# Patient Record
Sex: MALE | Race: WHITE | HISPANIC OR LATINO | Employment: FULL TIME | URBAN - METROPOLITAN AREA
[De-identification: names, ages, dates, MRNs, and addresses within clinical notes are randomized per-mention and may not be internally consistent; named-entity substitution may affect disease eponyms.]

---

## 2018-06-01 ENCOUNTER — HOSPITAL ENCOUNTER (EMERGENCY)
Facility: HOSPITAL | Age: 28
Discharge: HOME | End: 2018-06-01
Payer: OTHER GOVERNMENT

## 2018-06-01 VITALS
TEMPERATURE: 98 F | WEIGHT: 214.51 LBS | DIASTOLIC BLOOD PRESSURE: 82 MMHG | OXYGEN SATURATION: 98 % | SYSTOLIC BLOOD PRESSURE: 137 MMHG | RESPIRATION RATE: 16 BRPM | HEART RATE: 70 BPM

## 2018-06-01 DIAGNOSIS — N34.2 URETHRITIS: Primary | ICD-10-CM

## 2018-06-01 LAB
BACTERIA UR QL AUTO: ABNORMAL /HPF
BILIRUB UR QL STRIP: NEGATIVE
CLARITY UR: CLEAR
COLOR UR: YELLOW
GLUCOSE UR STRIP-MCNC: NEGATIVE MG/DL
HGB UR QL STRIP.AUTO: ABNORMAL
KETONES UR STRIP-MCNC: NEGATIVE MG/DL
LEUKOCYTE ESTERASE UR QL STRIP: NEGATIVE
MUCOUS THREADS UR QL AUTO: ABNORMAL
NITRITE UR QL STRIP: NEGATIVE
NON-SQ EPI CELLS URNS QL MICRO: ABNORMAL /HPF
PH UR STRIP.AUTO: 6 [PH] (ref 4.5–8)
PROT UR STRIP-MCNC: NEGATIVE MG/DL
RBC #/AREA URNS AUTO: ABNORMAL /HPF
SP GR UR STRIP.AUTO: 1.02 (ref 1–1.03)
UROBILINOGEN UR QL STRIP.AUTO: 1 E.U./DL
WBC #/AREA URNS AUTO: ABNORMAL /HPF

## 2018-06-01 PROCEDURE — 99283 EMERGENCY DEPT VISIT LOW MDM: CPT

## 2018-06-01 PROCEDURE — 96372 THER/PROPH/DIAG INJ SC/IM: CPT

## 2018-06-01 PROCEDURE — 87491 CHLMYD TRACH DNA AMP PROBE: CPT | Performed by: PHYSICIAN ASSISTANT

## 2018-06-01 PROCEDURE — 81001 URINALYSIS AUTO W/SCOPE: CPT | Performed by: PHYSICIAN ASSISTANT

## 2018-06-01 PROCEDURE — 87591 N.GONORRHOEAE DNA AMP PROB: CPT | Performed by: PHYSICIAN ASSISTANT

## 2018-06-01 RX ORDER — AZITHROMYCIN 250 MG/1
1000 TABLET, FILM COATED ORAL ONCE
Status: COMPLETED | OUTPATIENT
Start: 2018-06-01 | End: 2018-06-01

## 2018-06-01 RX ADMIN — LIDOCAINE HYDROCHLORIDE 250 MG: 10 INJECTION, SOLUTION EPIDURAL; INFILTRATION; INTRACAUDAL; PERINEURAL at 07:47

## 2018-06-01 RX ADMIN — AZITHROMYCIN 1000 MG: 250 TABLET, FILM COATED ORAL at 07:49

## 2018-06-01 NOTE — DISCHARGE INSTRUCTIONS
Nonspecific Urethritis in Men   WHAT YOU NEED TO KNOW:   Nonspecific urethritis is a condition that causes inflammation of the urethra  The urethra is the tube where urine passes from the bladder to the outside of the body  Men who have sex with men and those with multiple sexual partners are at a high risk of having this condition  DISCHARGE INSTRUCTIONS:   Medicines:   · Antibiotics: This medicine is used to treat an infection caused by bacteria  Take them as directed  · Take your medicine as directed  Contact your healthcare provider if you think your medicine is not helping or if you have side effects  Tell him of her if you are allergic to any medicine  Keep a list of the medicines, vitamins, and herbs you take  Include the amounts, and when and why you take them  Bring the list or the pill bottles to follow-up visits  Carry your medicine list with you in case of an emergency  Follow up with your healthcare provider as directed:  Write down your questions so you remember to ask them during your visits  Manage your symptoms:   · Heat:  Sit in a warm bath for 15 minutes at least 2 times a day  · Do not use chemical irritants: This includes bath soaps, spermicides, or other products that may cause irritation  Prevent nonspecific urethritis:  If your urethritis was caused by an infection, the following may help to prevent the spread:  · Use condoms:  Wear a condom during oral, vaginal, and anal sex  Ask for more information about the correct way to use condoms  · Do not have sex with someone who has urethritis: This includes oral, vaginal, and anal sex  · Do not have sex during treatment:  Do not have sex while you or your partners are being treated  Ask when it is safe to have sex  · Report pregnancy:  Tell your healthcare provider if your female partner is pregnant  You may have spread an infection to her, and she may pass it to her infant during birth    Contact your healthcare provider if:   · You have a fever  · You have chills, a cough, or feel weak and achy  · You continue to have signs or symptoms after being treated for nonspecific urethritis  · You have questions or concerns about your condition or care  Return to the emergency department if:   · You have joint stiffness, muscle pain, or eye inflammation  · You have pain and swelling in your scrotum  · You have severe abdominal pain  · You have chest pain or trouble breathing  © 2017 [facility] Information is for End User's use only and may not be sold, redistributed or otherwise used for commercial purposes  All illustrations and images included in CareNotes® are the copyrighted property of A D A M , Inc  or Nestor Rodriguez  The above information is an  only  It is not intended as medical advice for individual conditions or treatments  Talk to your doctor, nurse or pharmacist before following any medical regimen to see if it is safe and effective for you

## 2018-06-01 NOTE — ED PROVIDER NOTES
History  Chief Complaint   Patient presents with    Painful Urination     patient presents to the ED with c/o "green sperm" and states it feels like his "pee flow is blocked"  patient denies exposure to STD, was tested at urgent care for UTI, results were negative      70-year-old male with no significant past medical history presents to the emergency department with chief complaint of possible STD  Patient reports for the past three days he has noticed increasing urinary hesitancy, dysuria and green urethral discharge  Onset reported as 3 days ago  Location of symptoms reported as urethra  Quality is reported as dysuria with green colored urethral discharge  Severity is reported as moderate  Associated symptoms:  Denies fevers  Denies abdominal pain  Positive for urinary hesitancy and dysuria  Denies hematuria  Modifying factors:  Urination exacerbate symptoms  Context:  Patient reports he is currently sexually active with his girlfriend  Denies any prior history of STD infection  Patient reports he was seen at a local urgent care where they did a urinalysis which was negative  Medical summary: reviewed past visits via [employer], no prior visits to this ED  History provided by:  Patient   used: No        None       History reviewed  No pertinent past medical history  History reviewed  No pertinent surgical history  History reviewed  No pertinent family history  I have reviewed and agree with the history as documented  Social History   Substance Use Topics    Smoking status: Current Every Day Smoker     Types: E-Cigarettes    Smokeless tobacco: Never Used    Alcohol use No        Review of Systems   Constitutional: Negative for activity change, appetite change, chills, diaphoresis, fatigue, fever and unexpected weight change     HENT: Negative for congestion, dental problem, drooling, ear discharge, ear pain, facial swelling, hearing loss, mouth sores, nosebleeds, postnasal drip, rhinorrhea, sinus pain, sinus pressure, sneezing, sore throat, tinnitus, trouble swallowing and voice change  Eyes: Negative for photophobia, pain, discharge, redness, itching and visual disturbance  Respiratory: Negative for apnea, cough, choking, chest tightness, shortness of breath, wheezing and stridor  Cardiovascular: Negative for chest pain, palpitations and leg swelling  Gastrointestinal: Negative for abdominal distention, abdominal pain, anal bleeding, blood in stool, constipation, diarrhea, nausea and vomiting  Endocrine: Negative for cold intolerance, heat intolerance, polydipsia, polyphagia and polyuria  Genitourinary: Positive for discharge and dysuria  Negative for decreased urine volume, difficulty urinating, flank pain, frequency, hematuria, penile pain, penile swelling, scrotal swelling, testicular pain and urgency  Musculoskeletal: Negative for arthralgias, back pain, gait problem, joint swelling, myalgias, neck pain and neck stiffness  Skin: Negative for color change, pallor, rash and wound  Allergic/Immunologic: Negative for environmental allergies, food allergies and immunocompromised state  Neurological: Negative for dizziness, tremors, seizures, syncope, facial asymmetry, speech difficulty, weakness, light-headedness, numbness and headaches  Hematological: Negative for adenopathy  Does not bruise/bleed easily  Psychiatric/Behavioral: Negative for agitation, confusion and hallucinations  The patient is not nervous/anxious  All other systems reviewed and are negative  Physical Exam  Physical Exam   Constitutional: He is oriented to person, place, and time  He appears well-developed and well-nourished  No distress  /82   Pulse 70   Temp 98 °F (36 7 °C)   Resp 16   Wt 97 3 kg (214 lb 8 1 oz)   SpO2 98%    HENT:   Head: Normocephalic and atraumatic     Right Ear: External ear normal    Left Ear: External ear normal    Nose: Nose normal  Mouth/Throat: Oropharynx is clear and moist  No oropharyngeal exudate  Eyes: Conjunctivae and EOM are normal  Pupils are equal, round, and reactive to light  Right eye exhibits no discharge  Left eye exhibits no discharge  No scleral icterus  Neck: Normal range of motion  Neck supple  No JVD present  No tracheal deviation present  No thyromegaly present  Cardiovascular: Normal rate, regular rhythm and intact distal pulses  Pulmonary/Chest: Effort normal and breath sounds normal  No stridor  No respiratory distress  He has no wheezes  He has no rales  He exhibits no tenderness  Abdominal: Soft  Bowel sounds are normal  He exhibits no distension and no mass  There is no tenderness  There is no rebound and no guarding  No hernia  Musculoskeletal: Normal range of motion  He exhibits no edema, tenderness or deformity  Lymphadenopathy:     He has no cervical adenopathy  Neurological: He is alert and oriented to person, place, and time  He displays normal reflexes  No cranial nerve deficit or sensory deficit  He exhibits normal muscle tone  Coordination normal    Skin: Skin is warm and dry  Capillary refill takes less than 2 seconds  No rash noted  He is not diaphoretic  No erythema  No pallor  Psychiatric: He has a normal mood and affect  His behavior is normal  Judgment and thought content normal    Nursing note and vitals reviewed        Vital Signs  ED Triage Vitals   Temperature Pulse Respirations Blood Pressure SpO2   06/01/18 0733 06/01/18 0731 06/01/18 0731 06/01/18 0731 06/01/18 0731   98 °F (36 7 °C) 70 16 137/82 98 %      Temp src Heart Rate Source Patient Position - Orthostatic VS BP Location FiO2 (%)   -- -- -- -- --             Pain Score       --                  Vitals:    06/01/18 0731   BP: 137/82   Pulse: 70       Visual Acuity      ED Medications  Medications   cefTRIAXone (ROCEPHIN) 250 mg in lidocaine (PF) (XYLOCAINE-MPF) 1 % IM only syringe (250 mg Intramuscular Given 6/1/18 0747) azithromycin (ZITHROMAX) tablet 1,000 mg (1,000 mg Oral Given 6/1/18 0749)       Diagnostic Studies  Results Reviewed     Procedure Component Value Units Date/Time    Urine Microscopic [[id number]]  (Abnormal) Collected:  06/01/18 0738    Lab Status:  Final result Specimen:  Urine from Urine, Clean Catch Updated:  06/01/18 0757     RBC, UA 4-10 (A) /hpf      WBC, UA None Seen /hpf      Epithelial Cells None Seen /hpf      Bacteria, UA None Seen /hpf      MUCOUS THREADS Occasional (A)    UA w Reflex to Microscopic w Reflex to Culture [[id number]]  (Abnormal) Collected:  06/01/18 0738    Lab Status:  Final result Specimen:  Urine from Urine, Clean Catch Updated:  06/01/18 0744     Color, UA Yellow     Clarity, UA Clear     Specific Gravity, UA 1 025     pH, UA 6 0     Leukocytes, UA Negative     Nitrite, UA Negative     Protein, UA Negative mg/dl      Glucose, UA Negative mg/dl      Ketones, UA Negative mg/dl      Urobilinogen, UA 1 0 E U /dl      Bilirubin, UA Negative     Blood, UA Trace-Intact (A)    Chlamydia/GC amplified DNA by PCR [[id number]] Collected:  06/01/18 0738    Lab Status: In process Specimen:  Urine from Urine, Other Updated:  06/01/18 0741                 No orders to display              Procedures  Procedures       Phone Contacts  ED Phone Contact    ED Course  ED Course as of Jun 01 1545 Fri Jun 01, 2018   0756 Blood, UA: (!) Trace-Intact   0756 Nitrite, UA: Negative   0756 Leukocytes, UA: Negative   0756 UA reviewed- positive for trace blood  No nitrites or leukocytes  Leukocytes, UA: Negative                               MDM  Number of Diagnoses or Management Options  Urethritis: new and requires workup  Diagnosis management comments: ddx includes but is not limited to UTI, STI, pyelonephritis, kidney stone, plan check STD testing,  check UA  Lab results reviewed:  UA remarkable for trace blood, negative for leukocytes and nitrites  GC/chlamydia pending       Discussed lab results with patient at bedside  Discussed will treat empirically with coverage for GC/chlamydia  Discussed will call patient if testing positive and will need to inform all sexual partners within past 3 months  Discussed follow up with pcp and urology as outpatient in 3-5 days for further evaluation of symptoms  Reviewed reasons to return to ed  Patient verbalized understanding of diagnosis and agreement with discharge plan of care as well as understanding of reasons to return to ed  Amount and/or Complexity of Data Reviewed  Clinical lab tests: ordered and reviewed  Discussion of test results with the performing providers: yes  Obtain history from someone other than the patient: yes (Significant other at bedside  )  Review and summarize past medical records: yes    Patient Progress  Patient progress: stable    CritCare Time    Disposition  Final diagnoses:   Urethritis     Time reflects when diagnosis was documented in both MDM as applicable and the Disposition within this note     Time User Action Codes Description Comment    6/1/2018  7:39 AM Adrián Ramos Add [N34 2] Urethritis       ED Disposition     ED Disposition Condition Comment    Discharge  Kaylin Pair discharge to home/self care  Condition at discharge: Stable        Follow-up Information     Follow up With Specialties Details Why Contact Info Additional Information    Marry Matta MD Family Medicine Call in 2 days for further evaluation of symptoms [address]   [address]  [facility] [address]       Danny Alves MD Urology Call in 2 days for further evaluation of symptoms [place] [address]   [phone]       [facility] Emergency Department Emergency Medicine Go to If symptoms worsen [address] [facility] [address], [address], [place], [address]          There are no discharge medications for this patient      No discharge procedures on file      ED Provider  Electronically Signed by           Sheree Garcia PA-C  06/01/18 1549

## 2018-06-05 LAB
CHLAMYDIA DNA CVX QL NAA+PROBE: NORMAL
N GONORRHOEA DNA GENITAL QL NAA+PROBE: NORMAL

## 2018-12-11 ENCOUNTER — OFFICE VISIT (OUTPATIENT)
Dept: URGENT CARE | Age: 28
End: 2018-12-11
Payer: OTHER GOVERNMENT

## 2018-12-11 VITALS
BODY MASS INDEX: 29.12 KG/M2 | TEMPERATURE: 98.9 F | HEIGHT: 71 IN | OXYGEN SATURATION: 97 % | DIASTOLIC BLOOD PRESSURE: 74 MMHG | WEIGHT: 208 LBS | RESPIRATION RATE: 18 BRPM | SYSTOLIC BLOOD PRESSURE: 131 MMHG | HEART RATE: 68 BPM

## 2018-12-11 DIAGNOSIS — J45.901 MILD ASTHMA WITH ACUTE EXACERBATION, UNSPECIFIED WHETHER PERSISTENT: Primary | ICD-10-CM

## 2018-12-11 DIAGNOSIS — J20.8 ACUTE BRONCHITIS DUE TO OTHER SPECIFIED ORGANISMS: ICD-10-CM

## 2018-12-11 PROCEDURE — G0382 LEV 3 HOSP TYPE B ED VISIT: HCPCS | Performed by: FAMILY MEDICINE

## 2018-12-11 RX ORDER — ALBUTEROL SULFATE 2.5 MG/3ML
2.5 SOLUTION RESPIRATORY (INHALATION) ONCE
Status: COMPLETED | OUTPATIENT
Start: 2018-12-11 | End: 2018-12-11

## 2018-12-11 RX ORDER — AZITHROMYCIN 250 MG/1
TABLET, FILM COATED ORAL
Qty: 6 TABLET | Refills: 0 | Status: SHIPPED | OUTPATIENT
Start: 2018-12-11 | End: 2018-12-15

## 2018-12-11 RX ORDER — ALBUTEROL SULFATE 90 UG/1
1-2 AEROSOL, METERED RESPIRATORY (INHALATION) EVERY 6 HOURS PRN
Qty: 18 G | Refills: 0 | Status: SHIPPED | OUTPATIENT
Start: 2018-12-11

## 2018-12-11 RX ORDER — PREDNISONE 10 MG/1
TABLET ORAL
Qty: 1 EACH | Refills: 0 | Status: SHIPPED | OUTPATIENT
Start: 2018-12-11 | End: 2022-06-21

## 2018-12-11 RX ADMIN — Medication 0.5 MG: at 13:55

## 2018-12-11 RX ADMIN — ALBUTEROL SULFATE 2.5 MG: 2.5 SOLUTION RESPIRATORY (INHALATION) at 13:55

## 2018-12-11 NOTE — PATIENT INSTRUCTIONS
Take antibiotics as prescribed  Use steroids as prescribed  Did not take any NSAIDs such as ibuprofen, Aleve, Motrin with the steroids  Use inhaler if needed  Stay hydrated with lots of water/fluids  Follow-up with PCP in the next few days for reexamination and to ensure resolution of symptoms  Call [facility] to schedule an appointment: [phone]  Go to the ED if any intractable fevers, unable to stay hydrated, abdominal pain, chest pain, shortness of breath, new or worsening symptoms or other concerning symptoms

## 2018-12-11 NOTE — PROGRESS NOTES
[facility]        NAME: Fatuma Tineo is a 34 y o  male  : 1990    MRN: [id number]  DATE: 2018  TIME: 1:56 PM    Assessment and Plan   Mild asthma with acute exacerbation, unspecified whether persistent [J45 901]  1  Mild asthma with acute exacerbation, unspecified whether persistent  ipratropium (ATROVENT) 0 02 % inhalation solution 0 5 mg    albuterol inhalation solution 2 5 mg    albuterol (VENTOLIN HFA) 90 mcg/act inhaler    PredniSONE 10 MG (21) TBPK   2  Acute bronchitis due to other specified organisms  azithromycin (ZITHROMAX) 250 mg tablet    albuterol (VENTOLIN HFA) 90 mcg/act inhaler    PredniSONE 10 MG (21) TBPK      Patient reexamined after DuoNeb- patient feels much better  Lungs CTA bilaterally  No further wheezes  Denies any CP, SOB or further tightness  Stable for outpatient follow-up with PCP  InfoLink given  Patient verbalizes good understanding to follow-up with PCP or go to ED if any new or worsening conditions  Patient Instructions       Take antibiotics as prescribed  Use steroids as prescribed  Did not take any NSAIDs such as ibuprofen, Aleve, Motrin with the steroids  Use inhaler if needed  Stay hydrated with lots of water/fluids  Follow-up with PCP in the next few days for reexamination and to ensure resolution of symptoms  Call [facility] to schedule an appointment: [phone]  Go to the ED if any intractable fevers, unable to stay hydrated, abdominal pain, chest pain, shortness of breath, new or worsening symptoms or other concerning symptoms  Chief Complaint     Chief Complaint   Patient presents with    Cold Like Symptoms     cough, chest tightness, and difficulty breathing x 1 5 weeks         History of Present Illness       22-year-old male with past medical history of asthma presents with phlegmy/productive cough x1 5 weeks, intermittent lungs/chest tightness with deep breathing, intermittent wheezes     Patient states this feels like his prior asthma flare-ups with bronchitis  States he did smoke cigarettes and has been trying to quit however he has still been vaping  Denies any chest pain, shortness of breath, fevers, nausea, vomiting, abdominal pain, sore throat, ear pain or other complaints  States he did try a his old/ albuterol inhaler a few days ago which did help  Review of Systems   Review of Systems   Constitutional: Negative for chills, fatigue and fever  HENT: Negative for congestion, ear pain, rhinorrhea, sore throat and trouble swallowing  Eyes: Negative for itching and visual disturbance  Respiratory: Positive for cough and wheezing  Negative for shortness of breath  Cardiovascular: Negative for chest pain and leg swelling  Gastrointestinal: Negative for abdominal pain, diarrhea, nausea and vomiting  Musculoskeletal: Negative for back pain, joint swelling, neck pain and neck stiffness  Skin: Negative for rash  Neurological: Negative for dizziness, seizures, weakness, numbness and headaches  All other systems reviewed and are negative          Current Medications       Current Outpatient Prescriptions:     NICOTINE STEP 1 TD, Place on the skin, Disp: , Rfl:     albuterol (VENTOLIN HFA) 90 mcg/act inhaler, Inhale 1-2 puffs every 6 (six) hours as needed for wheezing, Disp: 18 g, Rfl: 0    azithromycin (ZITHROMAX) 250 mg tablet, Take 2 tablets today then 1 tablet daily x 4 days, Disp: 6 tablet, Rfl: 0    PredniSONE 10 MG (21) TBPK, Take 6tabs(60mg) on Day 1, 5tabs(50mg) on Day 2, 4tabs(40mg) on day 3, 3tabs(30mg) on Day 4, 2tabs(20mg) on Day 5, 1tab(10mg) on Day 6, Disp: 1 each, Rfl: 0    Current Facility-Administered Medications:     albuterol inhalation solution 2 5 mg, 2 5 mg, Nebulization, Once, Darby So PA-C    ipratropium (ATROVENT) 0 02 % inhalation solution 0 5 mg, 0 5 mg, Nebulization, Once, Darby So PA-C    Current Allergies     Allergies as of 2018    (No Known Allergies)            The following portions of the patient's history were reviewed and updated as appropriate: allergies, current medications, past family history, past medical history, past social history, past surgical history and problem list      Past Medical History:   Diagnosis Date    Asthma        History reviewed  No pertinent surgical history  No family history on file  Medications have been verified  Objective   /74   Pulse 68   Temp 98 9 °F (37 2 °C) (Temporal)   Resp 18   Ht 5' 11" (1 803 m)   Wt 94 3 kg (208 lb)   SpO2 97%   BMI 29 01 kg/m²        Physical Exam     Physical Exam   Constitutional: He is oriented to person, place, and time  He appears well-developed and well-nourished  No distress  Nontoxic appearing, talking in complete sentences without difficulty   HENT:   Head: Normocephalic and atraumatic  Right Ear: Hearing, tympanic membrane, external ear and ear canal normal    Left Ear: Hearing, tympanic membrane, external ear and ear canal normal    Nose: Rhinorrhea present  Right sinus exhibits no maxillary sinus tenderness and no frontal sinus tenderness  Left sinus exhibits no maxillary sinus tenderness and no frontal sinus tenderness  Mouth/Throat: Uvula is midline and mucous membranes are normal  Posterior oropharyngeal erythema present  No oropharyngeal exudate, posterior oropharyngeal edema or tonsillar abscesses  Eyes: Pupils are equal, round, and reactive to light  Conjunctivae and EOM are normal    Neck: Normal range of motion  Neck supple  Cardiovascular: Normal rate, regular rhythm and normal heart sounds  Pulmonary/Chest: Effort normal  No respiratory distress  He has wheezes (Mild, diffuse expiratory wheezes)  Musculoskeletal: Normal range of motion  He exhibits no tenderness  Neurological: He is alert and oriented to person, place, and time  Skin: Skin is warm and dry  No rash noted     Psychiatric: He has a normal mood and affect  His behavior is normal    Nursing note and vitals reviewed

## 2020-05-02 ENCOUNTER — HOSPITAL ENCOUNTER (EMERGENCY)
Facility: HOSPITAL | Age: 30
Discharge: HOME | End: 2020-05-03
Attending: EMERGENCY MEDICINE | Admitting: EMERGENCY MEDICINE
Payer: COMMERCIAL

## 2020-05-02 VITALS
SYSTOLIC BLOOD PRESSURE: 126 MMHG | DIASTOLIC BLOOD PRESSURE: 69 MMHG | OXYGEN SATURATION: 98 % | HEART RATE: 85 BPM | TEMPERATURE: 97.7 F | RESPIRATION RATE: 18 BRPM

## 2020-05-02 DIAGNOSIS — E86.0 DEHYDRATION: ICD-10-CM

## 2020-05-02 DIAGNOSIS — R51.9 HEADACHE: Primary | ICD-10-CM

## 2020-05-02 DIAGNOSIS — R11.2 NAUSEA & VOMITING: ICD-10-CM

## 2020-05-02 PROCEDURE — 99284 EMERGENCY DEPT VISIT MOD MDM: CPT

## 2020-05-02 RX ORDER — ONDANSETRON 4 MG/1
4 TABLET, ORALLY DISINTEGRATING ORAL ONCE
Status: COMPLETED | OUTPATIENT
Start: 2020-05-02 | End: 2020-05-02

## 2020-05-02 RX ADMIN — ONDANSETRON 4 MG: 4 TABLET, ORALLY DISINTEGRATING ORAL at 23:40

## 2020-05-03 LAB
ANION GAP SERPL CALCULATED.3IONS-SCNC: 7 MMOL/L (ref 4–13)
BASOPHILS # BLD AUTO: 0.06 THOUSANDS/ΜL (ref 0–0.1)
BASOPHILS NFR BLD AUTO: 1 % (ref 0–1)
BUN SERPL-MCNC: 12 MG/DL (ref 5–25)
CALCIUM SERPL-MCNC: 8.5 MG/DL (ref 8.3–10.1)
CHLORIDE SERPL-SCNC: 111 MMOL/L (ref 100–108)
CO2 SERPL-SCNC: 25 MMOL/L (ref 21–32)
CREAT SERPL-MCNC: 0.94 MG/DL (ref 0.6–1.3)
EOSINOPHIL # BLD AUTO: 0.05 THOUSAND/ΜL (ref 0–0.61)
EOSINOPHIL NFR BLD AUTO: 1 % (ref 0–6)
ERYTHROCYTE [DISTWIDTH] IN BLOOD BY AUTOMATED COUNT: 13.1 % (ref 11.6–15.1)
GFR SERPL CREATININE-BSD FRML MDRD: 110 ML/MIN/1.73SQ M
GLUCOSE SERPL-MCNC: 102 MG/DL (ref 65–140)
HCT VFR BLD AUTO: 42.7 % (ref 36.5–49.3)
HGB BLD-MCNC: 14.6 G/DL (ref 12–17)
IMM GRANULOCYTES # BLD AUTO: 0.04 THOUSAND/UL (ref 0–0.2)
IMM GRANULOCYTES NFR BLD AUTO: 0 % (ref 0–2)
LYMPHOCYTES # BLD AUTO: 0.95 THOUSANDS/ΜL (ref 0.6–4.47)
LYMPHOCYTES NFR BLD AUTO: 9 % (ref 14–44)
MCH RBC QN AUTO: 30.1 PG (ref 26.8–34.3)
MCHC RBC AUTO-ENTMCNC: 34.2 G/DL (ref 31.4–37.4)
MCV RBC AUTO: 88 FL (ref 82–98)
MONOCYTES # BLD AUTO: 0.49 THOUSAND/ΜL (ref 0.17–1.22)
MONOCYTES NFR BLD AUTO: 5 % (ref 4–12)
NEUTROPHILS # BLD AUTO: 8.9 THOUSANDS/ΜL (ref 1.85–7.62)
NEUTS SEG NFR BLD AUTO: 84 % (ref 43–75)
NRBC BLD AUTO-RTO: 0 /100 WBCS
PLATELET # BLD AUTO: 219 THOUSANDS/UL (ref 149–390)
PMV BLD AUTO: 10.7 FL (ref 8.9–12.7)
POTASSIUM SERPL-SCNC: 4.8 MMOL/L (ref 3.5–5.3)
RBC # BLD AUTO: 4.85 MILLION/UL (ref 3.88–5.62)
SODIUM SERPL-SCNC: 143 MMOL/L (ref 136–145)
WBC # BLD AUTO: 10.49 THOUSAND/UL (ref 4.31–10.16)

## 2020-05-03 PROCEDURE — 80048 BASIC METABOLIC PNL TOTAL CA: CPT | Performed by: EMERGENCY MEDICINE

## 2020-05-03 PROCEDURE — 96365 THER/PROPH/DIAG IV INF INIT: CPT

## 2020-05-03 PROCEDURE — 96361 HYDRATE IV INFUSION ADD-ON: CPT

## 2020-05-03 PROCEDURE — 36415 COLL VENOUS BLD VENIPUNCTURE: CPT | Performed by: EMERGENCY MEDICINE

## 2020-05-03 PROCEDURE — 85025 COMPLETE CBC W/AUTO DIFF WBC: CPT | Performed by: EMERGENCY MEDICINE

## 2020-05-03 PROCEDURE — 96375 TX/PRO/DX INJ NEW DRUG ADDON: CPT

## 2020-05-03 PROCEDURE — 99284 EMERGENCY DEPT VISIT MOD MDM: CPT | Performed by: EMERGENCY MEDICINE

## 2020-05-03 RX ORDER — METOCLOPRAMIDE HYDROCHLORIDE 5 MG/ML
10 INJECTION INTRAMUSCULAR; INTRAVENOUS ONCE
Status: COMPLETED | OUTPATIENT
Start: 2020-05-03 | End: 2020-05-03

## 2020-05-03 RX ORDER — KETOROLAC TROMETHAMINE 30 MG/ML
15 INJECTION, SOLUTION INTRAMUSCULAR; INTRAVENOUS ONCE
Status: COMPLETED | OUTPATIENT
Start: 2020-05-03 | End: 2020-05-03

## 2020-05-03 RX ORDER — ACETAMINOPHEN 325 MG/1
975 TABLET ORAL ONCE
Status: COMPLETED | OUTPATIENT
Start: 2020-05-03 | End: 2020-05-03

## 2020-05-03 RX ORDER — MAGNESIUM SULFATE HEPTAHYDRATE 40 MG/ML
2 INJECTION, SOLUTION INTRAVENOUS ONCE
Status: COMPLETED | OUTPATIENT
Start: 2020-05-03 | End: 2020-05-03

## 2020-05-03 RX ADMIN — MAGNESIUM SULFATE HEPTAHYDRATE 2 G: 40 INJECTION, SOLUTION INTRAVENOUS at 00:57

## 2020-05-03 RX ADMIN — ACETAMINOPHEN 975 MG: 325 TABLET ORAL at 00:19

## 2020-05-03 RX ADMIN — METOCLOPRAMIDE 10 MG: 5 INJECTION, SOLUTION INTRAMUSCULAR; INTRAVENOUS at 00:56

## 2020-05-03 RX ADMIN — SODIUM CHLORIDE 1000 ML: 0.9 INJECTION, SOLUTION INTRAVENOUS at 00:20

## 2020-05-03 RX ADMIN — KETOROLAC TROMETHAMINE 15 MG: 30 INJECTION, SOLUTION INTRAMUSCULAR at 00:19

## 2021-03-03 ENCOUNTER — HOSPITAL ENCOUNTER (EMERGENCY)
Facility: HOSPITAL | Age: 31
Discharge: HOME | End: 2021-03-03
Attending: EMERGENCY MEDICINE | Admitting: EMERGENCY MEDICINE
Payer: COMMERCIAL

## 2021-03-03 VITALS
OXYGEN SATURATION: 97 % | HEART RATE: 72 BPM | RESPIRATION RATE: 17 BRPM | TEMPERATURE: 98.9 F | SYSTOLIC BLOOD PRESSURE: 124 MMHG | BODY MASS INDEX: 29 KG/M2 | WEIGHT: 207.89 LBS | DIASTOLIC BLOOD PRESSURE: 65 MMHG

## 2021-03-03 DIAGNOSIS — G51.0 BELL'S PALSY: Primary | ICD-10-CM

## 2021-03-03 PROCEDURE — 86618 LYME DISEASE ANTIBODY: CPT | Performed by: EMERGENCY MEDICINE

## 2021-03-03 PROCEDURE — 99285 EMERGENCY DEPT VISIT HI MDM: CPT | Performed by: EMERGENCY MEDICINE

## 2021-03-03 PROCEDURE — 99284 EMERGENCY DEPT VISIT MOD MDM: CPT

## 2021-03-03 PROCEDURE — 93005 ELECTROCARDIOGRAM TRACING: CPT

## 2021-03-03 PROCEDURE — 36415 COLL VENOUS BLD VENIPUNCTURE: CPT | Performed by: EMERGENCY MEDICINE

## 2021-03-03 RX ORDER — PREDNISONE 20 MG/1
60 TABLET ORAL ONCE
Status: COMPLETED | OUTPATIENT
Start: 2021-03-03 | End: 2021-03-03

## 2021-03-03 RX ORDER — MINERAL OIL, PETROLATUM 425; 568 MG/G; MG/G
0.25 OINTMENT OPHTHALMIC
Qty: 3.5 G | Refills: 1 | Status: SHIPPED | OUTPATIENT
Start: 2021-03-03

## 2021-03-03 RX ORDER — VALACYCLOVIR HYDROCHLORIDE 500 MG/1
1000 TABLET, FILM COATED ORAL ONCE
Status: COMPLETED | OUTPATIENT
Start: 2021-03-03 | End: 2021-03-03

## 2021-03-03 RX ORDER — PREDNISONE 10 MG/1
TABLET ORAL
Qty: 54 TABLET | Refills: 0 | Status: SHIPPED | OUTPATIENT
Start: 2021-03-04 | End: 2021-03-18

## 2021-03-03 RX ORDER — VALACYCLOVIR HYDROCHLORIDE 1 G/1
1000 TABLET, FILM COATED ORAL 3 TIMES DAILY
Qty: 30 TABLET | Refills: 0 | Status: SHIPPED | OUTPATIENT
Start: 2021-03-03 | End: 2022-06-21

## 2021-03-03 RX ADMIN — VALACYCLOVIR HYDROCHLORIDE 1000 MG: 500 TABLET, FILM COATED ORAL at 09:01

## 2021-03-03 RX ADMIN — PREDNISONE 60 MG: 20 TABLET ORAL at 09:01

## 2021-03-03 NOTE — ED PROVIDER NOTES
History  Chief Complaint   Patient presents with    Facial Numbness     Pt with facial numbness, started yesterday he thinks  Patient is a 65-year-old male with past medical history of asthma, presents to the emergency department complaining of numbness and tingling and weakness of the left side of his face that he 1st noticed yesterday but worsened today  Patient reports that he feels as though the left side of his face is frozen and he is having difficulty closing his eye  He denies ever having these symptoms before  Patient does report recently having an exposure to STD and got STD checked on 02/22 and was positive for chlamydia  He was negative for HIV, gonorrhea, syphilis  He did not get tested for herpes  Patient reports that prior to getting tested he was having penile discharge and dysuria but that has resolved  He was prescribed doxycycline and has approximately 2 days left  He also got a shot of an antibiotic but is unsure what it was  He reports about a week ago he did have a rash on both of his arms but that has resolved  The rash was nonpruritic  He also states that several weeks ago he was having some spasm in the left side of his neck but that too has resolved  Patient reports he feels as though the left eye is dry and slightly blurry because of that  He denies any fever, neck pain or stiffness currently, shaking chills, headaches, dizziness or near syncope, photophobia, tinnitus, loss of hearing, chest pain, palpitations, dyspnea, abdominal pain, nausea, vomiting, diarrhea, constipation, urinary symptoms currently, penile pain or discharge currently, testicular pain or swelling, genital sores or lesions, mouth sores or lesions, extremity swelling or pain, extremity weakness or paresthesia, slurring of speech or difficulty getting words out or other focal neurologic deficits    He denies any known or recent tick bite but does report he works outside and is in the woods a lot       History provided by:  Patient   used: No        Prior to Admission Medications   Prescriptions Last Dose Informant Patient Reported? Taking? NICOTINE STEP 1 TD   Yes No   Sig: Place on the skin   PredniSONE 10 MG (21) TBPK   No No   Sig: Take 6tabs(60mg) on Day 1, 5tabs(50mg) on Day 2, 4tabs(40mg) on day 3, 3tabs(30mg) on Day 4, 2tabs(20mg) on Day 5, 1tab(10mg) on Day 6   albuterol (VENTOLIN HFA) 90 mcg/act inhaler   No No   Sig: Inhale 1-2 puffs every 6 (six) hours as needed for wheezing      Facility-Administered Medications: None       Past Medical History:   Diagnosis Date    Asthma        History reviewed  No pertinent surgical history  History reviewed  No pertinent family history  I have reviewed and agree with the history as documented  E-Cigarette/Vaping     E-Cigarette/Vaping Substances     Social History     Tobacco Use    Smoking status: Current Every Day Smoker     Types: E-Cigarettes    Smokeless tobacco: Never Used   Substance Use Topics    Alcohol use: No    Drug use: No       Review of Systems   Constitutional: Negative for chills and fever  HENT: Negative for congestion, ear pain, hearing loss, rhinorrhea, sore throat and tinnitus  Eyes: Positive for visual disturbance  Negative for photophobia and pain  +Left eye dryness   Respiratory: Negative for cough, chest tightness, shortness of breath and wheezing  Cardiovascular: Negative for chest pain and palpitations  Gastrointestinal: Negative for abdominal pain, blood in stool, constipation, diarrhea, nausea and vomiting  Genitourinary: Negative for dysuria, flank pain, frequency and hematuria  Musculoskeletal: Negative for back pain, neck pain and neck stiffness  Skin: Negative for color change, pallor, rash and wound  Allergic/Immunologic: Negative for immunocompromised state  Neurological: Positive for facial asymmetry and numbness   Negative for dizziness, seizures, syncope, weakness, light-headedness and headaches  Hematological: Negative for adenopathy  Psychiatric/Behavioral: Negative for confusion and decreased concentration  All other systems reviewed and are negative  Physical Exam  Physical Exam  Vitals signs and nursing note reviewed  Constitutional:       General: He is not in acute distress  Appearance: Normal appearance  He is well-developed  He is not ill-appearing, toxic-appearing or diaphoretic  HENT:      Head: Normocephalic and atraumatic  Right Ear: Tympanic membrane, ear canal and external ear normal       Left Ear: Tympanic membrane, ear canal and external ear normal       Nose: Nose normal       Mouth/Throat:      Mouth: Mucous membranes are moist       Pharynx: Oropharynx is clear  No oropharyngeal exudate  Eyes:      General:         Right eye: No discharge  Left eye: No discharge  Extraocular Movements: Extraocular movements intact  Conjunctiva/sclera: Conjunctivae normal       Pupils: Pupils are equal, round, and reactive to light  Neck:      Musculoskeletal: Normal range of motion and neck supple  No neck rigidity  Vascular: No JVD  Cardiovascular:      Rate and Rhythm: Normal rate and regular rhythm  Pulses: Normal pulses  Heart sounds: Normal heart sounds  No murmur  No friction rub  No gallop  Pulmonary:      Effort: Pulmonary effort is normal  No respiratory distress  Breath sounds: Normal breath sounds  No wheezing, rhonchi or rales  Abdominal:      General: There is no distension  Palpations: Abdomen is soft  Tenderness: There is no abdominal tenderness  There is no guarding or rebound  Musculoskeletal: Normal range of motion  General: No swelling or tenderness  Lymphadenopathy:      Cervical: No cervical adenopathy  Skin:     General: Skin is warm and dry  Coloration: Skin is not pale  Findings: No erythema or rash     Neurological:      General: No focal deficit present  Mental Status: He is alert and oriented to person, place, and time  Cranial Nerves: Cranial nerve deficit present  Sensory: No sensory deficit  Motor: No weakness  Comments: 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 including the upper, mid and lower face  Patient unable to fully close the eye and unable to lift left eyebrow or crackle left side of forehead  5/5 strength in all 4 extremities  No gross sensory deficits in all 4 extremities  Patient has mild decreased sensation over the left side of his upper face compared to the right side  Normal speech  Psychiatric:         Mood and Affect: Mood normal          Behavior: Behavior normal          Vital Signs  ED Triage Vitals   Temperature Pulse Respirations Blood Pressure SpO2   03/03/21 0904 03/03/21 0817 03/03/21 0817 03/03/21 0817 03/03/21 0817   98 9 °F (37 2 °C) 82 16 140/75 99 %      Temp Source Heart Rate Source Patient Position - Orthostatic VS BP Location FiO2 (%)   03/03/21 0904 03/03/21 0830 03/03/21 0830 03/03/21 0830 --   Oral Monitor Lying Right arm       Pain Score       --                Vitals:    03/03/21 0817 03/03/21 0830 03/03/21 0900 03/03/21 0904   BP: 140/75 135/80 124/65    BP Location:  Right arm Right arm    Pulse: 82 80 72    Resp: 16 18 17    Temp:    98 9 °F (37 2 °C)   TempSrc:    Oral   SpO2: 99% 97% 97%    Weight: 94 3 kg (207 lb 14 3 oz)          Visual Acuity  Visual Acuity      Most Recent Value   L Pupil Size (mm)  4   R Pupil Size (mm)  4          ED Medications  Medications   valACYclovir (VALTREX) tablet 1,000 mg (1,000 mg Oral Given 3/3/21 0901)   predniSONE tablet 60 mg (60 mg Oral Given 3/3/21 0901)       Diagnostic Studies  Results Reviewed     Procedure Component Value Units Date/Time    Lyme Antibody Profile with reflex to [facility] [[id number]] Collected: 03/03/21 0855    Lab Status:  In process Specimen: Blood from Arm, Left Updated: 03/03/21 9756 No orders to display              Procedures  ECG 12 Lead Documentation Only    Date/Time: 3/3/2021 9:07 AM  Performed by: Nicola Anne DO  Authorized by: Nicola Anne DO     ECG reviewed by me, the ED Provider: yes    Patient location:  ED  Rate:     ECG rate:  77    ECG rate assessment: normal    Rhythm:     Rhythm: sinus rhythm    Ectopy:     Ectopy: none    QRS:     QRS axis:  Normal    QRS intervals:  Normal  Conduction:     Conduction: normal    ST segments:     ST segments:  Normal  T waves:     T waves: normal               ED Course                             SBIRT 22yo+      Most Recent Value   SBIRT (24 yo +)   In order to provide better care to our patients, we are screening all of our patients for alcohol and drug use  Would it be okay to ask you these screening questions? Yes Filed at: 03/03/2021 0840   Initial Alcohol Screen: US AUDIT-C    1  How often do you have a drink containing alcohol?  0 Filed at: 03/03/2021 0840   2  How many drinks containing alcohol do you have on a typical day you are drinking? 0 Filed at: 03/03/2021 0840   3a  Male UNDER 65: How often do you have five or more drinks on one occasion? 0 Filed at: 03/03/2021 0840   3b  FEMALE Any Age, or MALE 65+: How often do you have 4 or more drinks on one occassion? 0 Filed at: 03/03/2021 0840   Audit-C Score  0 Filed at: 03/03/2021 0840   STEPHANIE: How many times in the past year have you    Used an illegal drug or used a prescription medication for non-medical reasons? Never Filed at: 03/03/2021 0840                    MDM  Number of Diagnoses or Management Options  Diagnosis management comments: 72-year-old male presents with 2 days of left facial drooping and paresthesia of the left side of his face  He was recently diagnosed with chlamydia after a new sexual partner several weeks ago and is on his last couple of days of doxycycline  He also recently tested negative for syphilis and HIV    Patient's physical exam consistent with Bell's palsy/cranial nerve 7 palsy  Will start patient on a tapering dose of prednisone and a 10 day course of valacyclovir  Advised him to finish the doxycycline  Will check Lyme test in the blood work and if positive, will add additional 2 weeks to the doxycycline he has already taken for 1 week  Will prescribe Lacri-Lube and recommend lubricating eyedrops and eye patching night to avoid dry eye  Will refer to Neurology  Discussed ED return parameters  Amount and/or Complexity of Data Reviewed  Clinical lab tests: ordered  Tests in the medicine section of CPT®: reviewed        Disposition  Final diagnoses:   Bell's palsy     Time reflects when diagnosis was documented in both MDM as applicable and the Disposition within this note     Time User Action Codes Description Comment    3/3/2021  9:09 AM Yan [place] E Add [G51 0] Bell's palsy       ED Disposition     ED Disposition Condition Date/Time Comment    Discharge Stable Wed Mar 3, 2021  9:09 AM Hesham Cornelius discharge to home/self care              Follow-up Information     Follow up With Specialties Details Why Contact Info Additional Information    Lidya Bustillos DO General Practice Schedule an appointment as soon as possible for a visit   [place] [phone]       Neurology [facility] Neurology Schedule an appointment as soon as possible for a visit   [facility] [address]  [phone] Neurology [facility], [address] [place], [place], [address]    [facility] Emergency Department Emergency Medicine Go to  If symptoms worsen [address] Emergency Department, [address] [place] [address], [phone]          Patient's Medications   Discharge Prescriptions    MINERAL OIL-WHITE PETROLATUM (REFRESH LACRI-LUBE)    Apply 0 25 inches to eye daily at bedtime       Start Date: 3/3/2021  End Date: --       Order Dose: 0 25 inches       Quantity: 3 5 g    Refills: 1    PREDNISONE 10 MG TABLET    Take 6 tablets (60 mg total) by mouth daily for 4 days, THEN 5 tablets (50 mg total) daily for 2 days, THEN 4 tablets (40 mg total) daily for 2 days, THEN 3 tablets (30 mg total) daily for 2 days, THEN 2 tablets (20 mg total) daily for 2 days, THEN 1 tablet (10 mg total) daily for 2 days  Start Date: 3/4/2021  End Date: 3/18/2021       Order Dose: --       Quantity: 54 tablet    Refills: 0    VALACYCLOVIR (VALTREX) 1,000 MG TABLET    Take 1 tablet (1,000 mg total) by mouth 3 (three) times a day for 10 days       Start Date: 3/3/2021  End Date: 3/13/2021       Order Dose: 1,000 mg       Quantity: 30 tablet    Refills: 0     No discharge procedures on file      PDMP Review     None          ED Provider  Electronically Signed by           Tommy Amaro DO  03/03/21 7810

## 2021-03-04 LAB
ATRIAL RATE: 77 BPM
B BURGDOR IGG+IGM SER-ACNC: 35
P AXIS: 55 DEGREES
PR INTERVAL: 148 MS
QRS AXIS: 69 DEGREES
QRSD INTERVAL: 96 MS
QT INTERVAL: 360 MS
QTC INTERVAL: 407 MS
T WAVE AXIS: 51 DEGREES
VENTRICULAR RATE: 77 BPM

## 2021-03-04 PROCEDURE — 93010 ELECTROCARDIOGRAM REPORT: CPT | Performed by: INTERNAL MEDICINE

## 2021-03-22 ENCOUNTER — TRANSCRIBE ORDERS (OUTPATIENT)
Dept: LAB | Facility: CLINIC | Age: 31
End: 2021-03-22

## 2021-03-22 ENCOUNTER — APPOINTMENT (OUTPATIENT)
Dept: LAB | Facility: CLINIC | Age: 31
End: 2021-03-22
Payer: COMMERCIAL

## 2021-03-22 DIAGNOSIS — R53.83 OTHER FATIGUE: Primary | ICD-10-CM

## 2021-03-22 DIAGNOSIS — R53.83 OTHER FATIGUE: ICD-10-CM

## 2021-03-22 LAB
BUN SERPL-MCNC: 14 MG/DL (ref 5–25)
CREAT SERPL-MCNC: 0.76 MG/DL (ref 0.6–1.3)
CRP SERPL QL: <3 MG/L
ERYTHROCYTE [SEDIMENTATION RATE] IN BLOOD: 14 MM/HOUR (ref 0–14)
GFR SERPL CREATININE-BSD FRML MDRD: 123 ML/MIN/1.73SQ M
HCYS SERPL-SCNC: 7.7 UMOL/L (ref 5.3–14.2)

## 2021-03-22 PROCEDURE — 86140 C-REACTIVE PROTEIN: CPT

## 2021-03-22 PROCEDURE — 84520 ASSAY OF UREA NITROGEN: CPT

## 2021-03-22 PROCEDURE — 86147 CARDIOLIPIN ANTIBODY EA IG: CPT

## 2021-03-22 PROCEDURE — 85652 RBC SED RATE AUTOMATED: CPT

## 2021-03-22 PROCEDURE — 86235 NUCLEAR ANTIGEN ANTIBODY: CPT

## 2021-03-22 PROCEDURE — 84165 PROTEIN E-PHORESIS SERUM: CPT | Performed by: PATHOLOGY

## 2021-03-22 PROCEDURE — 36415 COLL VENOUS BLD VENIPUNCTURE: CPT

## 2021-03-22 PROCEDURE — 86038 ANTINUCLEAR ANTIBODIES: CPT

## 2021-03-22 PROCEDURE — 85303 CLOT INHIBIT PROT C ACTIVITY: CPT

## 2021-03-22 PROCEDURE — 86225 DNA ANTIBODY NATIVE: CPT

## 2021-03-22 PROCEDURE — 86618 LYME DISEASE ANTIBODY: CPT

## 2021-03-22 PROCEDURE — 86430 RHEUMATOID FACTOR TEST QUAL: CPT

## 2021-03-22 PROCEDURE — 83090 ASSAY OF HOMOCYSTEINE: CPT

## 2021-03-22 PROCEDURE — 82164 ANGIOTENSIN I ENZYME TEST: CPT

## 2021-03-22 PROCEDURE — 81241 F5 GENE: CPT

## 2021-03-22 PROCEDURE — 82565 ASSAY OF CREATININE: CPT

## 2021-03-22 PROCEDURE — 83520 IMMUNOASSAY QUANT NOS NONAB: CPT

## 2021-03-22 PROCEDURE — 85306 CLOT INHIBIT PROT S FREE: CPT

## 2021-03-22 PROCEDURE — 84165 PROTEIN E-PHORESIS SERUM: CPT

## 2021-03-23 ENCOUNTER — TRANSCRIBE ORDERS (OUTPATIENT)
Dept: ADMINISTRATIVE | Facility: HOSPITAL | Age: 31
End: 2021-03-23

## 2021-03-23 ENCOUNTER — HOSPITAL ENCOUNTER (OUTPATIENT)
Dept: CT IMAGING | Facility: HOSPITAL | Age: 31
Discharge: HOME | End: 2021-03-23
Attending: PSYCHIATRY & NEUROLOGY
Payer: COMMERCIAL

## 2021-03-23 DIAGNOSIS — I77.71 DISSECTION OF CAROTID ARTERY (HCC): ICD-10-CM

## 2021-03-23 DIAGNOSIS — G46.3 VENTRAL MEDULLARY SYNDROME: Primary | ICD-10-CM

## 2021-03-23 LAB
ACE SERPL-CCNC: 32 U/L (ref 14–82)
ALBUMIN SERPL ELPH-MCNC: 4.67 G/DL (ref 3.5–5)
ALBUMIN SERPL ELPH-MCNC: 60.6 % (ref 52–65)
ALPHA1 GLOB SERPL ELPH-MCNC: 0.27 G/DL (ref 0.1–0.4)
ALPHA1 GLOB SERPL ELPH-MCNC: 3.5 % (ref 2.5–5)
ALPHA2 GLOB SERPL ELPH-MCNC: 0.7 G/DL (ref 0.4–1.2)
ALPHA2 GLOB SERPL ELPH-MCNC: 9.1 % (ref 7–13)
B BURGDOR IGG+IGM SER-ACNC: 22
BETA GLOB ABNORMAL SERPL ELPH-MCNC: 0.45 G/DL (ref 0.4–0.8)
BETA1 GLOB SERPL ELPH-MCNC: 5.8 % (ref 5–13)
BETA2 GLOB SERPL ELPH-MCNC: 5.1 % (ref 2–8)
BETA2+GAMMA GLOB SERPL ELPH-MCNC: 0.39 G/DL (ref 0.2–0.5)
CARDIOLIPIN IGA SER IA-ACNC: 2
CARDIOLIPIN IGG SER IA-ACNC: 0.9
CARDIOLIPIN IGM SER IA-ACNC: 2
CENTROMERE B AB SER-ACNC: <0.2 AI (ref 0–0.9)
DSDNA AB SER-ACNC: <1 IU/ML (ref 0–9)
ENA SS-A AB SER-ACNC: <0.2 AI (ref 0–0.9)
ENA SS-B AB SER-ACNC: <0.2 AI (ref 0–0.9)
GAMMA GLOB ABNORMAL SERPL ELPH-MCNC: 1.22 G/DL (ref 0.5–1.6)
GAMMA GLOB SERPL ELPH-MCNC: 15.9 % (ref 12–22)
IGG/ALB SER: 1.54 {RATIO} (ref 1.1–1.8)
PROT PATTERN SERPL ELPH-IMP: NORMAL
PROT SERPL-MCNC: 7.7 G/DL (ref 6.4–8.2)
RHEUMATOID FACT SER QL LA: NEGATIVE

## 2021-03-23 PROCEDURE — 70498 CT ANGIOGRAPHY NECK: CPT

## 2021-03-23 RX ADMIN — IOHEXOL 85 ML: 350 INJECTION, SOLUTION INTRAVENOUS at 16:20

## 2021-03-24 LAB — RYE IGE QN: NEGATIVE

## 2021-03-25 LAB
F5 GENE MUT ANL BLD/T: NORMAL
PROT C AG ACT/NOR PPP IA: 149 % OF NORMAL (ref 60–150)
PROT S ACT/NOR PPP: 96 % (ref 71–117)
PROTEINASE3 AB SER IA-ACNC: <3.5 U/ML (ref 0–3.5)

## 2021-04-12 ENCOUNTER — HOSPITAL ENCOUNTER (OUTPATIENT)
Dept: MRI IMAGING | Facility: CLINIC | Age: 31
Discharge: HOME | End: 2021-04-12
Payer: COMMERCIAL

## 2021-04-12 ENCOUNTER — HOSPITAL ENCOUNTER (OUTPATIENT)
Dept: CT IMAGING | Facility: CLINIC | Age: 31
Discharge: HOME | End: 2021-04-12
Payer: COMMERCIAL

## 2021-04-12 DIAGNOSIS — G46.3 VENTRAL MEDULLARY SYNDROME: ICD-10-CM

## 2021-04-12 PROCEDURE — 70553 MRI BRAIN STEM W/O & W/DYE: CPT

## 2021-04-12 PROCEDURE — A9585 GADOBUTROL INJECTION: HCPCS | Performed by: PSYCHIATRY & NEUROLOGY

## 2021-04-12 PROCEDURE — 70496 CT ANGIOGRAPHY HEAD: CPT

## 2021-04-12 RX ADMIN — GADOBUTROL 9 ML: 604.72 INJECTION INTRAVENOUS at 09:58

## 2021-04-12 RX ADMIN — IOHEXOL 85 ML: 350 INJECTION, SOLUTION INTRAVENOUS at 10:23

## 2022-06-19 ENCOUNTER — APPOINTMENT (EMERGENCY)
Dept: CT IMAGING | Facility: HOSPITAL | Age: 32
DRG: 872 | End: 2022-06-19
Payer: COMMERCIAL

## 2022-06-19 ENCOUNTER — HOSPITAL ENCOUNTER (INPATIENT)
Facility: HOSPITAL | Age: 32
LOS: 2 days | Discharge: HOME | DRG: 872 | End: 2022-06-21
Attending: EMERGENCY MEDICINE | Admitting: STUDENT IN AN ORGANIZED HEALTH CARE EDUCATION/TRAINING PROGRAM
Payer: COMMERCIAL

## 2022-06-19 ENCOUNTER — APPOINTMENT (EMERGENCY)
Dept: ULTRASOUND IMAGING | Facility: HOSPITAL | Age: 32
DRG: 872 | End: 2022-06-19
Payer: COMMERCIAL

## 2022-06-19 DIAGNOSIS — N41.0 ACUTE PROSTATITIS: ICD-10-CM

## 2022-06-19 DIAGNOSIS — N39.0 UTI (URINARY TRACT INFECTION): Primary | ICD-10-CM

## 2022-06-19 DIAGNOSIS — Z76.89 ENCOUNTER TO ESTABLISH CARE: ICD-10-CM

## 2022-06-19 PROBLEM — A41.9 SEVERE SEPSIS (HCC): Status: ACTIVE | Noted: 2022-06-19

## 2022-06-19 PROBLEM — N41.9 PROSTATITIS: Status: ACTIVE | Noted: 2022-06-19

## 2022-06-19 PROBLEM — E87.1 HYPONATREMIA: Status: ACTIVE | Noted: 2022-06-19

## 2022-06-19 PROBLEM — R65.20 SEVERE SEPSIS (HCC): Status: ACTIVE | Noted: 2022-06-19

## 2022-06-19 PROBLEM — Z72.0 TOBACCO CHEW USE: Status: ACTIVE | Noted: 2022-06-19

## 2022-06-19 LAB
ALBUMIN SERPL BCP-MCNC: 3.3 G/DL (ref 3.5–5)
ALP SERPL-CCNC: 46 U/L (ref 46–116)
ALT SERPL W P-5'-P-CCNC: 24 U/L (ref 12–78)
ANION GAP SERPL CALCULATED.3IONS-SCNC: 5 MMOL/L (ref 4–13)
ANION GAP SERPL CALCULATED.3IONS-SCNC: 8 MMOL/L (ref 4–13)
APTT PPP: 37 SECONDS (ref 23–37)
AST SERPL W P-5'-P-CCNC: 14 U/L (ref 5–45)
BACTERIA UR QL AUTO: ABNORMAL /HPF
BASOPHILS # BLD AUTO: 0.06 THOUSANDS/ΜL (ref 0–0.1)
BASOPHILS NFR BLD AUTO: 0 % (ref 0–1)
BILIRUB SERPL-MCNC: 1.45 MG/DL (ref 0.2–1)
BILIRUB UR QL STRIP: NEGATIVE
BUN SERPL-MCNC: 10 MG/DL (ref 5–25)
BUN SERPL-MCNC: 7 MG/DL (ref 5–25)
CALCIUM ALBUM COR SERPL-MCNC: 9 MG/DL (ref 8.3–10.1)
CALCIUM SERPL-MCNC: 7.7 MG/DL (ref 8.3–10.1)
CALCIUM SERPL-MCNC: 8.4 MG/DL (ref 8.3–10.1)
CHLORIDE SERPL-SCNC: 105 MMOL/L (ref 100–108)
CHLORIDE SERPL-SCNC: 99 MMOL/L (ref 100–108)
CLARITY UR: CLEAR
CO2 SERPL-SCNC: 26 MMOL/L (ref 21–32)
CO2 SERPL-SCNC: 27 MMOL/L (ref 21–32)
COLOR UR: ABNORMAL
CREAT SERPL-MCNC: 0.97 MG/DL (ref 0.6–1.3)
CREAT SERPL-MCNC: 1.11 MG/DL (ref 0.6–1.3)
EOSINOPHIL # BLD AUTO: 0 THOUSAND/ΜL (ref 0–0.61)
EOSINOPHIL NFR BLD AUTO: 0 % (ref 0–6)
ERYTHROCYTE [DISTWIDTH] IN BLOOD BY AUTOMATED COUNT: 13.7 % (ref 11.6–15.1)
FLUAV RNA RESP QL NAA+PROBE: NEGATIVE
FLUBV RNA RESP QL NAA+PROBE: NEGATIVE
GFR SERPL CREATININE-BSD FRML MDRD: 104 ML/MIN/1.73SQ M
GFR SERPL CREATININE-BSD FRML MDRD: 88 ML/MIN/1.73SQ M
GLUCOSE SERPL-MCNC: 106 MG/DL (ref 65–140)
GLUCOSE SERPL-MCNC: 129 MG/DL (ref 65–140)
GLUCOSE UR STRIP-MCNC: ABNORMAL MG/DL
HCT VFR BLD AUTO: 41.2 % (ref 36.5–49.3)
HGB BLD-MCNC: 14 G/DL (ref 12–17)
HGB UR QL STRIP.AUTO: ABNORMAL
IMM GRANULOCYTES # BLD AUTO: 0.12 THOUSAND/UL (ref 0–0.2)
IMM GRANULOCYTES NFR BLD AUTO: 1 % (ref 0–2)
INR PPP: 1.25 (ref 0.84–1.19)
KETONES UR STRIP-MCNC: ABNORMAL MG/DL
LACTATE SERPL-SCNC: 1 MMOL/L (ref 0.5–2)
LEUKOCYTE ESTERASE UR QL STRIP: ABNORMAL
LYMPHOCYTES # BLD AUTO: 1.27 THOUSANDS/ΜL (ref 0.6–4.47)
LYMPHOCYTES NFR BLD AUTO: 6 % (ref 14–44)
MCH RBC QN AUTO: 29.9 PG (ref 26.8–34.3)
MCHC RBC AUTO-ENTMCNC: 34 G/DL (ref 31.4–37.4)
MCV RBC AUTO: 88 FL (ref 82–98)
MONOCYTES # BLD AUTO: 1.86 THOUSAND/ΜL (ref 0.17–1.22)
MONOCYTES NFR BLD AUTO: 8 % (ref 4–12)
NEUTROPHILS # BLD AUTO: 19.9 THOUSANDS/ΜL (ref 1.85–7.62)
NEUTS SEG NFR BLD AUTO: 85 % (ref 43–75)
NITRITE UR QL STRIP: POSITIVE
NON-SQ EPI CELLS URNS QL MICRO: ABNORMAL /HPF
NRBC BLD AUTO-RTO: 0 /100 WBCS
PH UR STRIP.AUTO: 5 [PH]
PLATELET # BLD AUTO: 235 THOUSANDS/UL (ref 149–390)
PMV BLD AUTO: 9.9 FL (ref 8.9–12.7)
POTASSIUM SERPL-SCNC: 3.9 MMOL/L (ref 3.5–5.3)
POTASSIUM SERPL-SCNC: 4.3 MMOL/L (ref 3.5–5.3)
PROCALCITONIN SERPL-MCNC: 0.68 NG/ML
PROT SERPL-MCNC: 7.7 G/DL (ref 6.4–8.2)
PROT UR STRIP-MCNC: >=300 MG/DL
PROTHROMBIN TIME: 15.2 SECONDS (ref 11.6–14.5)
RBC # BLD AUTO: 4.69 MILLION/UL (ref 3.88–5.62)
RBC #/AREA URNS AUTO: ABNORMAL /HPF
RSV RNA RESP QL NAA+PROBE: NEGATIVE
SARS-COV-2 RNA RESP QL NAA+PROBE: NEGATIVE
SODIUM SERPL-SCNC: 133 MMOL/L (ref 136–145)
SODIUM SERPL-SCNC: 137 MMOL/L (ref 136–145)
SP GR UR STRIP.AUTO: 1.01 (ref 1–1.03)
UROBILINOGEN UR QL STRIP.AUTO: >=8 E.U./DL
WBC # BLD AUTO: 23.21 THOUSAND/UL (ref 4.31–10.16)
WBC #/AREA URNS AUTO: ABNORMAL /HPF

## 2022-06-19 PROCEDURE — 99223 1ST HOSP IP/OBS HIGH 75: CPT | Performed by: STUDENT IN AN ORGANIZED HEALTH CARE EDUCATION/TRAINING PROGRAM

## 2022-06-19 PROCEDURE — 96365 THER/PROPH/DIAG IV INF INIT: CPT

## 2022-06-19 PROCEDURE — 83605 ASSAY OF LACTIC ACID: CPT | Performed by: EMERGENCY MEDICINE

## 2022-06-19 PROCEDURE — G1004 CDSM NDSC: HCPCS

## 2022-06-19 PROCEDURE — 85610 PROTHROMBIN TIME: CPT | Performed by: EMERGENCY MEDICINE

## 2022-06-19 PROCEDURE — 85025 COMPLETE CBC W/AUTO DIFF WBC: CPT | Performed by: EMERGENCY MEDICINE

## 2022-06-19 PROCEDURE — 80053 COMPREHEN METABOLIC PANEL: CPT | Performed by: EMERGENCY MEDICINE

## 2022-06-19 PROCEDURE — 93005 ELECTROCARDIOGRAM TRACING: CPT

## 2022-06-19 PROCEDURE — 87040 BLOOD CULTURE FOR BACTERIA: CPT | Performed by: EMERGENCY MEDICINE

## 2022-06-19 PROCEDURE — 74176 CT ABD & PELVIS W/O CONTRAST: CPT

## 2022-06-19 PROCEDURE — 87086 URINE CULTURE/COLONY COUNT: CPT | Performed by: STUDENT IN AN ORGANIZED HEALTH CARE EDUCATION/TRAINING PROGRAM

## 2022-06-19 PROCEDURE — 99285 EMERGENCY DEPT VISIT HI MDM: CPT

## 2022-06-19 PROCEDURE — 0241U HB NFCT DS VIR RESP RNA 4 TRGT: CPT | Performed by: EMERGENCY MEDICINE

## 2022-06-19 PROCEDURE — 99285 EMERGENCY DEPT VISIT HI MDM: CPT | Performed by: EMERGENCY MEDICINE

## 2022-06-19 PROCEDURE — 85730 THROMBOPLASTIN TIME PARTIAL: CPT | Performed by: EMERGENCY MEDICINE

## 2022-06-19 PROCEDURE — 84145 PROCALCITONIN (PCT): CPT | Performed by: EMERGENCY MEDICINE

## 2022-06-19 PROCEDURE — 36415 COLL VENOUS BLD VENIPUNCTURE: CPT | Performed by: EMERGENCY MEDICINE

## 2022-06-19 PROCEDURE — 96361 HYDRATE IV INFUSION ADD-ON: CPT

## 2022-06-19 PROCEDURE — 87591 N.GONORRHOEAE DNA AMP PROB: CPT | Performed by: EMERGENCY MEDICINE

## 2022-06-19 PROCEDURE — 87491 CHLMYD TRACH DNA AMP PROBE: CPT | Performed by: EMERGENCY MEDICINE

## 2022-06-19 PROCEDURE — 80048 BASIC METABOLIC PNL TOTAL CA: CPT | Performed by: STUDENT IN AN ORGANIZED HEALTH CARE EDUCATION/TRAINING PROGRAM

## 2022-06-19 PROCEDURE — 76870 US EXAM SCROTUM: CPT

## 2022-06-19 PROCEDURE — 81001 URINALYSIS AUTO W/SCOPE: CPT

## 2022-06-19 PROCEDURE — 96375 TX/PRO/DX INJ NEW DRUG ADDON: CPT

## 2022-06-19 RX ORDER — ENOXAPARIN SODIUM 100 MG/ML
40 INJECTION SUBCUTANEOUS DAILY
Status: DISCONTINUED | OUTPATIENT
Start: 2022-06-19 | End: 2022-06-21 | Stop reason: HOSPADM

## 2022-06-19 RX ORDER — KETOROLAC TROMETHAMINE 30 MG/ML
15 INJECTION, SOLUTION INTRAMUSCULAR; INTRAVENOUS ONCE
Status: COMPLETED | OUTPATIENT
Start: 2022-06-19 | End: 2022-06-19

## 2022-06-19 RX ORDER — ACETAMINOPHEN 325 MG/1
650 TABLET ORAL EVERY 6 HOURS PRN
Status: DISCONTINUED | OUTPATIENT
Start: 2022-06-19 | End: 2022-06-19

## 2022-06-19 RX ORDER — OXYCODONE HYDROCHLORIDE 5 MG/1
5 TABLET ORAL EVERY 4 HOURS PRN
Status: DISCONTINUED | OUTPATIENT
Start: 2022-06-19 | End: 2022-06-21 | Stop reason: HOSPADM

## 2022-06-19 RX ORDER — OXYCODONE HYDROCHLORIDE 5 MG/1
2.5 TABLET ORAL EVERY 4 HOURS PRN
Status: DISCONTINUED | OUTPATIENT
Start: 2022-06-19 | End: 2022-06-21 | Stop reason: HOSPADM

## 2022-06-19 RX ORDER — ACETAMINOPHEN 325 MG/1
650 TABLET ORAL ONCE
Status: COMPLETED | OUTPATIENT
Start: 2022-06-19 | End: 2022-06-19

## 2022-06-19 RX ORDER — ACETAMINOPHEN 325 MG/1
650 TABLET ORAL EVERY 6 HOURS PRN
Status: DISCONTINUED | OUTPATIENT
Start: 2022-06-19 | End: 2022-06-21 | Stop reason: HOSPADM

## 2022-06-19 RX ADMIN — SODIUM CHLORIDE 1000 ML: 0.9 INJECTION, SOLUTION INTRAVENOUS at 18:20

## 2022-06-19 RX ADMIN — SODIUM CHLORIDE 1000 ML: 0.9 INJECTION, SOLUTION INTRAVENOUS at 20:32

## 2022-06-19 RX ADMIN — CEFTRIAXONE 2000 MG: 2 INJECTION, POWDER, FOR SOLUTION INTRAMUSCULAR; INTRAVENOUS at 13:47

## 2022-06-19 RX ADMIN — ACETAMINOPHEN 650 MG: 325 TABLET, FILM COATED ORAL at 22:26

## 2022-06-19 RX ADMIN — KETOROLAC TROMETHAMINE 15 MG: 30 INJECTION, SOLUTION INTRAMUSCULAR at 12:57

## 2022-06-19 RX ADMIN — SODIUM CHLORIDE 1000 ML: 0.9 INJECTION, SOLUTION INTRAVENOUS at 17:19

## 2022-06-19 RX ADMIN — SODIUM CHLORIDE 1000 ML: 0.9 INJECTION, SOLUTION INTRAVENOUS at 12:57

## 2022-06-19 RX ADMIN — ACETAMINOPHEN 650 MG: 325 TABLET, FILM COATED ORAL at 12:18

## 2022-06-19 RX ADMIN — DOXYCYCLINE 100 MG: 100 INJECTION, POWDER, LYOPHILIZED, FOR SOLUTION INTRAVENOUS at 16:21

## 2022-06-19 NOTE — H&P
[facility]  H&P- Arlene Samanigeo 1990, 29 y o  male MRN: [id number]  Unit/Bed#: Sree Tj 87 432-02 Encounter: [id number]  Primary Care Provider: Sai Munguia DO   Date and time admitted to hospital: 6/19/2022 11:04 AM    * Severe sepsis [facility])  Assessment & Plan  29year old male patient with past medical history of asthma, patient was seen at urgent care center 2 days ago for urinary symptoms and was started on Ceftin for cystitis however presents to [facility] Emergency room complaining of fever and not improving urinary symptoms  Sexually active with 1 partner  Reports recently tested negative for STD  Present on admission with fever, tachycardia, leukocytosis, bilirubinemia, elevated procalcitonin secondary to cystitis as well as prostatitis  COVID-19 negative  CT abdomen pelvis report noted with cystitis component prostatitis  Renal ultrasound report noted with bilateral varicoceles, no testicular torsion  Currently patient is hemodynamically stable, acutely nontoxic appearing  Did receive IV ceftriaxone in ED   GC/chlamydia PCR pending  Continue IV ceftriaxone, start on IV doxycycline empirically for prostatitis  Continue with IV fluids  Follow-up with pending urine and blood cultures  Continue supportive care  Tobacco chew use  Assessment & Plan  Patient reports chewing tobacco as well as also reports using electric cigarettes  Counseled on cessation  Hyponatremia  Assessment & Plan  Presented with sodium 133  No previous labs to compare baseline  Follow-up with urine sodium, urine osmolality  Continue IV hydration  Follow-up with BMP  Prostatitis  Assessment & Plan  GC/chlamydia PCR pending  Continue ceftriaxone, started on IV doxycycline empirically  VTE Pharmacologic Prophylaxis: VTE Score: 0 Low Risk (Score 0-2) - Encourage Ambulation    Code Status: Level 1 - Full Code   Discussion with family: Updated  (mother) at bedside  Anticipated Length of Stay: Patient will be admitted on an inpatient basis with an anticipated length of stay of greater than 2 midnights secondary to Prostatitis  Total Time for Visit, including Counseling / Coordination of Care: 60 minutes Greater than 50% of this total time spent on direct patient counseling and coordination of care  Chief Complaint:  Dysuria, urinary frequency, urgency, hematuria, fever    History of Present Illness:    Bryan Patton is a 29 y o  male with no reported significant past medical history who presents to [facility] Emergency room with complaining of fever, dysuria, urinary urgency, hematuria, associated perirectal spasms for last 3 days  Patient reports that he was seen at urgent care center 3 days ago and was diagnosed with UTI and was discharged on Ceftin however reports persistent fever and without any relief to urinary symptoms  Currently my encounter patient appears comfortable not in distress  Denies chest pain, dyspnea, fever, chills, cough, abdominal pain, diarrhea, any other new complaints  No other events reported  Denies any penile discharge  Reports sexually active with 1 female partner  Patient also reports recently tested negative for STD  Reports chewing tobacco, using electric cigarettes  Denies any other substance use  No other events noted  Review of Systems:  Review of Systems   Constitutional: Positive for fever  Negative for chills, diaphoresis and fatigue  HENT: Negative for congestion  Eyes: Negative for visual disturbance  Respiratory: Negative for cough and shortness of breath  Cardiovascular: Negative for chest pain, palpitations and leg swelling  Gastrointestinal: Negative for abdominal pain, diarrhea, nausea and vomiting  Endocrine: Positive for polyuria  Genitourinary: Positive for dysuria, hematuria and urgency  Negative for penile discharge, penile pain, penile swelling and testicular pain  Musculoskeletal: Negative for neck stiffness  Neurological: Negative for weakness  Psychiatric/Behavioral: Negative for agitation  Past Medical and Surgical History:   Past Medical History:   Diagnosis Date    Asthma        History reviewed  No pertinent surgical history  Meds/Allergies:  Prior to Admission medications    Medication Sig Start Date End Date Taking? Authorizing Provider   albuterol (VENTOLIN HFA) 90 mcg/act inhaler Inhale 1-2 puffs every 6 (six) hours as needed for wheezing 12/11/18   Tatyana Ivan PA-C   mineral oil-white petrolatum (REFRESH LACRI-LUBE) Apply 0 25 inches to eye daily at bedtime 3/3/21   Ayse Henry, DO   NICOTINE STEP 1 TD Place on the skin    Historical Provider, MD   PredniSONE 10 MG (21) TBPK Take 6tabs(60mg) on Day 1, 5tabs(50mg) on Day 2, 4tabs(40mg) on day 3, 3tabs(30mg) on Day 4, 2tabs(20mg) on Day 5, 1tab(10mg) on Day 6 12/11/18   Tatyana Ivan PA-C   valACYclovir (VALTREX) 1,000 mg tablet Take 1 tablet (1,000 mg total) by mouth 3 (three) times a day for 10 days 3/3/21 3/13/21  Ayse Brittons, DO     Not on medications  Allergies: No Known Allergies    Social History:  Marital Status: Single   Occupation: electric [employer]  Substance Use History:   Social History     Substance and Sexual Activity   Alcohol Use No     Social History     Tobacco Use   Smoking Status Current Every Day Smoker    Types: E-Cigarettes   Smokeless Tobacco Never Used     Social History     Substance and Sexual Activity   Drug Use No       Family History:  History reviewed  No pertinent family history      Physical Exam:     Vitals:   Blood Pressure: 119/70 (06/19/22 1600)  Pulse: 90 (06/19/22 1600)  Temperature: 98 9 °F (37 2 °C) (06/19/22 1600)  Temp Source: Oral (06/19/22 1301)  Respirations: 19 (06/19/22 1600)  Height: 5' 11" (180 3 cm) (06/19/22 1030)  Weight - Scale: 104 kg (230 lb) (06/19/22 1030)  SpO2: 98 % (06/19/22 1600)    Physical Exam  Constitutional:       General: He is not in acute distress  Appearance: Normal appearance  He is not ill-appearing  HENT:      Head: Normocephalic and atraumatic  Mouth/Throat:      Pharynx: No oropharyngeal exudate  Eyes:      Pupils: Pupils are equal, round, and reactive to light  Cardiovascular:      Rate and Rhythm: Normal rate  Pulses: Normal pulses  Heart sounds: Normal heart sounds  Pulmonary:      Effort: Pulmonary effort is normal  No respiratory distress  Breath sounds: Normal breath sounds  No stridor  No wheezing or rhonchi  Abdominal:      General: Bowel sounds are normal  There is no distension  Palpations: Abdomen is soft  Tenderness: There is no abdominal tenderness  Musculoskeletal:      Cervical back: Normal range of motion and neck supple  Neurological:      General: No focal deficit present  Mental Status: He is alert and oriented to person, place, and time  Mental status is at baseline     Psychiatric:         Mood and Affect: Mood normal          Behavior: Behavior normal          Additional Data:     Lab Results:  Results from last 7 days   Lab Units 06/19/22  1246   WBC Thousand/uL 23 21*   HEMOGLOBIN g/dL 14 0   HEMATOCRIT % 41 2   PLATELETS Thousands/uL 235   NEUTROS PCT % 85*   LYMPHS PCT % 6*   MONOS PCT % 8   EOS PCT % 0     Results from last 7 days   Lab Units 06/19/22  1245   SODIUM mmol/L 133*   POTASSIUM mmol/L 4 3   CHLORIDE mmol/L 99*   CO2 mmol/L 26   BUN mg/dL 7   CREATININE mg/dL 1 11   ANION GAP mmol/L 8   CALCIUM mg/dL 8 4   ALBUMIN g/dL 3 3*   TOTAL BILIRUBIN mg/dL 1 45*   ALK PHOS U/L 46   ALT U/L 24   AST U/L 14   GLUCOSE RANDOM mg/dL 106     Results from last 7 days   Lab Units 06/19/22  1246   INR  1 25*             Results from last 7 days   Lab Units 06/19/22  1246   LACTIC ACID mmol/L 1 0   PROCALCITONIN ng/ml 0 68*       Imaging: Reviewed radiology reports from this admission including: abdominal/pelvic CT  US scrotum and testicles   Final Result by Kishan Herman MD (06/19 1442)       Bilateral varicoceles  No evidence of testicular torsion  Workstation performed: [id number]         CT abdomen pelvis wo contrast   Final Result by Magalis Martinez MD (06/19 1311)      Circumferential bladder wall thickening and extensive surrounding fat stranding suspicious for cystitis  Concomitant prostamegaly may indicate prostatitis  Workstation performed: [id number]                 ** Please Note: This note has been constructed using a voice recognition system   **

## 2022-06-19 NOTE — ASSESSMENT & PLAN NOTE
29year old male patient with past medical history of asthma, patient was seen at urgent care center 2 days ago for urinary symptoms and was started on Ceftin for cystitis however presents to [facility] Emergency room complaining of fever and not improving urinary symptoms  Sexually active with 1 partner  Reports recently tested negative for STD  Present on admission with fever, tachycardia, leukocytosis, bilirubinemia, elevated procalcitonin secondary to cystitis as well as prostatitis  COVID-19 negative  CT abdomen pelvis report noted with cystitis component prostatitis  Renal ultrasound report noted with bilateral varicoceles, no testicular torsion  Currently patient is hemodynamically stable, acutely nontoxic appearing  Did receive IV ceftriaxone in ED   GC/chlamydia PCR pending  Continue IV ceftriaxone, start on IV doxycycline empirically for prostatitis  Continue with IV fluids  Follow-up with pending urine and blood cultures  Continue supportive care

## 2022-06-19 NOTE — ED PROVIDER NOTES
History  Chief Complaint   Patient presents with    Possible UTI     Pt c/o burning with urination, fever that started on Friday      29year-old male presents emergency room with urinary symptoms and fever x2 days  Patient states that he has had dysuria and urgency over the last 2 days  States that he was seen in urgent care 2 days ago and diagnosed with UTI  He states that he was placed on Ceftin and reports that symptoms have not improved  He states that he has had a fever to 103 over the last 2 days and has continued urinary symptoms  He states that now he has suprapubic abdominal pain and low back pain which she describes as aching  He has nausea but denies vomiting  He denies hx of similar in the past  Has tried tylenol and azo without any relief  No other complaints  He states that he is sexually active with 1 individual and denies any known exposure to STDs  He states that he was recently tested for STDs which was negative  He reports achy bilateral testicular pain  Denies any swelling  Denies any penile discharge or rash  No other complaints  History provided by:  Patient  Urinary Frequency  Location:  Urinary frequency, urgency, and dysuria   Quality:  Burning   Severity:  Moderate  Onset quality:  Sudden  Duration:  2 days  Timing:  Constant  Progression:  Worsening  Chronicity:  New  Ineffective treatments:  Abx, azo, and tylenol  Associated symptoms: no abdominal pain, no chest pain, no congestion, no cough, no diarrhea, no ear pain, no fever, no headaches, no nausea, no rash, no shortness of breath, no sore throat, no vomiting and no wheezing        Prior to Admission Medications   Prescriptions Last Dose Informant Patient Reported? Taking?    NICOTINE STEP 1 TD   Yes No   Sig: Place on the skin   PredniSONE 10 MG (21) TBPK   No No   Sig: Take 6tabs(60mg) on Day 1, 5tabs(50mg) on Day 2, 4tabs(40mg) on day 3, 3tabs(30mg) on Day 4, 2tabs(20mg) on Day 5, 1tab(10mg) on Day 6   albuterol (VENTOLIN HFA) 90 mcg/act inhaler   No No   Sig: Inhale 1-2 puffs every 6 (six) hours as needed for wheezing   mineral oil-white petrolatum (REFRESH LACRI-LUBE)   No No   Sig: Apply 0 25 inches to eye daily at bedtime   valACYclovir (VALTREX) 1,000 mg tablet   No No   Sig: Take 1 tablet (1,000 mg total) by mouth 3 (three) times a day for 10 days      Facility-Administered Medications: None       Past Medical History:   Diagnosis Date    Asthma        History reviewed  No pertinent surgical history  History reviewed  No pertinent family history  I have reviewed and agree with the history as documented  E-Cigarette/Vaping     E-Cigarette/Vaping Substances     Social History     Tobacco Use    Smoking status: Current Every Day Smoker     Types: E-Cigarettes    Smokeless tobacco: Never Used   Substance Use Topics    Alcohol use: No    Drug use: No       Review of Systems   Constitutional: Negative for chills and fever  HENT: Negative for congestion, ear pain and sore throat  Eyes: Negative for pain and visual disturbance  Respiratory: Negative for cough, shortness of breath and wheezing  Cardiovascular: Negative for chest pain and leg swelling  Gastrointestinal: Negative for abdominal pain, diarrhea, nausea and vomiting  Genitourinary: Positive for dysuria, frequency and urgency  Negative for difficulty urinating and hematuria  Musculoskeletal: Negative for neck pain and neck stiffness  Skin: Negative for rash and wound  Neurological: Negative for weakness, numbness and headaches  Psychiatric/Behavioral: Negative for agitation and confusion  All other systems reviewed and are negative  Physical Exam  Physical Exam  Vitals and nursing note reviewed  Constitutional:       Appearance: He is well-developed  HENT:      Head: Normocephalic and atraumatic  Eyes:      Pupils: Pupils are equal, round, and reactive to light     Cardiovascular:      Rate and Rhythm: Normal rate and regular rhythm  Pulmonary:      Effort: Pulmonary effort is normal       Breath sounds: Normal breath sounds  Abdominal:      General: Bowel sounds are normal       Palpations: Abdomen is soft  Tenderness: There is abdominal tenderness  There is right CVA tenderness and left CVA tenderness  Musculoskeletal:         General: Normal range of motion  Cervical back: Normal range of motion and neck supple  Skin:     General: Skin is warm and dry  Neurological:      General: No focal deficit present  Mental Status: He is alert and oriented to person, place, and time  Comments: No focal deficits         Vital Signs  ED Triage Vitals   Temperature Pulse Respirations Blood Pressure SpO2   06/19/22 1030 06/19/22 1030 06/19/22 1030 06/19/22 1030 06/19/22 1030   (!) 102 2 °F (39 °C) (!) 113 17 123/67 97 %      Temp Source Heart Rate Source Patient Position - Orthostatic VS BP Location FiO2 (%)   06/19/22 1030 06/19/22 1030 06/19/22 1030 06/19/22 1030 --   Oral Monitor Sitting Right arm       Pain Score       06/19/22 1301       No Pain           Vitals:    06/19/22 1030 06/19/22 1301   BP: 123/67 115/55   Pulse: (!) 113 91   Patient Position - Orthostatic VS: Sitting Lying         Visual Acuity      ED Medications  Medications   acetaminophen (TYLENOL) tablet 650 mg (650 mg Oral Given 6/19/22 1218)   sodium chloride 0 9 % bolus 1,000 mL (1,000 mL Intravenous New Bag 6/19/22 1257)   ketorolac (TORADOL) injection 15 mg (15 mg Intravenous Given 6/19/22 1257)   cefTRIAXone (ROCEPHIN) 2,000 mg in dextrose 5 % 50 mL IVPB (2,000 mg Intravenous New Bag 6/19/22 1347)       Diagnostic Studies  Results Reviewed     Procedure Component Value Units Date/Time    Chlamydia/GC amplified DNA by PCR [[id number]] Collected: 06/19/22 1347    Lab Status:  In process Specimen: Urine, Other Updated: 06/19/22 1353    COVID/FLU/RSV - 2 hour TAT [[id number]]  (Normal) Collected: 06/19/22 1256    Lab Status: Final result Specimen: Nares from Nose Updated: 06/19/22 1345     SARS-CoV-2 Negative     INFLUENZA A PCR Negative     INFLUENZA B PCR Negative     RSV PCR Negative    Narrative:      FOR PEDIATRIC PATIENTS - copy/paste COVID Guidelines URL to browser: [email or web address]    SARS-CoV-2 assay is a Nucleic Acid Amplification assay intended for the  qualitative detection of nucleic acid from SARS-CoV-2 in nasopharyngeal  swabs  Results are for the presumptive identification of SARS-CoV-2 RNA  Positive results are indicative of infection with SARS-CoV-2, the virus  causing COVID-19, but do not rule out bacterial infection or co-infection  with other viruses  Laboratories within the [place] and its  territories are required to report all positive results to the appropriate  public health authorities  Negative results do not preclude SARS-CoV-2  infection and should not be used as the sole basis for treatment or other  patient management decisions  Negative results must be combined with  clinical observations, patient history, and epidemiological information  This test has not been FDA cleared or approved  This test has been authorized by FDA under an Emergency Use Authorization  (EUA)  This test is only authorized for the duration of time the  declaration that circumstances exist justifying the authorization of the  emergency use of an in vitro diagnostic tests for detection of SARS-CoV-2  virus and/or diagnosis of COVID-19 infection under section 564(b)(1) of  the Act, 21 U  S C  705VHI-7(K)(1), unless the authorization is terminated  or revoked sooner  The test has been validated but independent review by FDA  and CLIA is pending  Test performed using [employer] GeneXpert: This RT-PCR assay targets N2,  a region unique to SARS-CoV-2  A conserved region in the E-gene was chosen  for pan-Sarbecovirus detection which includes SARS-CoV-2      Procalcitonin [[id number]] (Abnormal) Collected: 06/19/22 1246    Lab Status: Final result Specimen: Blood Updated: 06/19/22 1321     Procalcitonin 0 68 ng/ml     Lactic acid [[id number]]  (Normal) Collected: 06/19/22 1246    Lab Status: Final result Specimen: Blood Updated: 06/19/22 1319     LACTIC ACID 1 0 mmol/L     Narrative:      Result may be elevated if tourniquet was used during collection  Comprehensive metabolic panel [[id number]]  (Abnormal) Collected: 06/19/22 1245    Lab Status: Final result Specimen: Blood Updated: 06/19/22 1309     Sodium 133 mmol/L      Potassium 4 3 mmol/L      Chloride 99 mmol/L      CO2 26 mmol/L      ANION GAP 8 mmol/L      BUN 7 mg/dL      Creatinine 1 11 mg/dL      Glucose 106 mg/dL      Calcium 8 4 mg/dL      Corrected Calcium 9 0 mg/dL      AST 14 U/L      ALT 24 U/L      Alkaline Phosphatase 46 U/L      Total Protein 7 7 g/dL      Albumin 3 3 g/dL      Total Bilirubin 1 45 mg/dL      eGFR 88 ml/min/1 73sq m     Narrative:      [place] guidelines for Chronic Kidney Disease (CKD):     Stage 1 with normal or high GFR (GFR > 90 mL/min/1 73 square meters)    Stage 2 Mild CKD (GFR = 60-89 mL/min/1 73 square meters)    Stage 3A Moderate CKD (GFR = 45-59 mL/min/1 73 square meters)    Stage 3B Moderate CKD (GFR = 30-44 mL/min/1 73 square meters)    Stage 4 Severe CKD (GFR = 15-29 mL/min/1 73 square meters)    Stage 5 End Stage CKD (GFR <15 mL/min/1 73 square meters)  Note: GFR calculation is accurate only with a steady state creatinine    Protime-INR [[id number]]  (Abnormal) Collected: 06/19/22 1246    Lab Status: Final result Specimen: Blood Updated: 06/19/22 1305     Protime 15 2 seconds      INR 1 25    APTT [[id number]]  (Normal) Collected: 06/19/22 1246    Lab Status: Final result Specimen: Blood Updated: 06/19/22 1305     PTT 37 seconds     Blood culture #1 [[id number]] Collected: 06/19/22 1256    Lab Status:  In process Specimen: Blood from Hand, Left Updated: 06/19/22 1301 CBC and differential [[id number]]  (Abnormal) Collected: 06/19/22 1246    Lab Status: Final result Specimen: Blood Updated: 06/19/22 1250     WBC 23 21 Thousand/uL      RBC 4 69 Million/uL      Hemoglobin 14 0 g/dL      Hematocrit 41 2 %      MCV 88 fL      MCH 29 9 pg      MCHC 34 0 g/dL      RDW 13 7 %      MPV 9 9 fL      Platelets 773 Thousands/uL      nRBC 0 /100 WBCs      Neutrophils Relative 85 %      Immat GRANS % 1 %      Lymphocytes Relative 6 %      Monocytes Relative 8 %      Eosinophils Relative 0 %      Basophils Relative 0 %      Neutrophils Absolute 19 90 Thousands/µL      Immature Grans Absolute 0 12 Thousand/uL      Lymphocytes Absolute 1 27 Thousands/µL      Monocytes Absolute 1 86 Thousand/µL      Eosinophils Absolute 0 00 Thousand/µL      Basophils Absolute 0 06 Thousands/µL     Blood culture #2 [[id number]] Updated: 06/19/22 1245    Lab Status: In process Specimen: Blood     Urine Microscopic [[id number]]  (Abnormal) Collected: 06/19/22 1033    Lab Status: Final result Specimen: Urine, Clean Catch Updated: 06/19/22 1050     RBC, UA 0-1 /hpf      WBC, UA 4-10 /hpf      Epithelial Cells Occasional /hpf      Bacteria, UA Occasional /hpf     UA w Reflex to Microscopic w Reflex to Culture [[id number]]  (Abnormal) Collected: 06/19/22 1033    Lab Status: Final result Specimen: Urine, Clean Catch Updated: 06/19/22 1044     Color, UA Orange     Clarity, UA Clear     Specific Gravity, UA 1 015     pH, UA 5 0     Leukocytes, UA Trace     Nitrite, UA Positive     Protein, UA >=300 mg/dl      Glucose,  (1/4%) mg/dl      Ketones, UA 15 (1+) mg/dl      Urobilinogen, UA >=8 0 E U /dl      Bilirubin, UA Negative     Blood, UA Trace-Intact                 US scrotum and testicles   Final Result by Kathi Giraldo MD (06/19 1442)       Bilateral varicoceles  No evidence of testicular torsion         Workstation performed: [id number]         CT abdomen pelvis wo contrast   Final Result by Rod Buck Georgette Beck MD (06/19 1311)      Circumferential bladder wall thickening and extensive surrounding fat stranding suspicious for cystitis  Concomitant prostamegaly may indicate prostatitis  Workstation performed: [id number]                    Procedures  Procedures         ED Course  ED Course as of 06/19/22 1528   Sun Jun 19, 2022   1140 Nitrite, UA(!): Positive   1324 WBC(!): 23 21   1326 Patient's initial care got delayed due to stroke alert patient coming in after I had signed up for him  After dealing with stroke alert patient, I returned to see the patient and he was in the bathroom  Patient was seen at 1220p  Septic workup ordered  Antibiotics not initially done on initial eval due to pending CT results for exact source of infection  Appears to be from UTI - rocephin ordered  Initial Sepsis Screening     Row Name 06/19/22 1330                Is the patient's history suggestive of a new or worsening infection? Yes (Proceed)  -SW        Suspected source of infection urinary tract infection  -SW        Are two or more of the following signs & symptoms of infection both present and new to the patient? --        Indicate SIRS criteria Hyperthemia > 38 3C (100 9F); Tachycardia > 90 bpm;Leukocytosis (WBC > 64566 IJL)  -SW        If the answer is yes to both questions, suspicion of sepsis is present --        If severe sepsis is present AND tissue hypoperfusion perists in the hour after fluid resuscitation or lactate > 4, the patient meets criteria for SEPTIC SHOCK --        Are any of the following organ dysfunction criteria present within 6 hours of suspected infection and SIRS criteria that are NOT considered to be chronic conditions?  No  -SW        Organ dysfunction --        Date of presentation of severe sepsis --        Time of presentation of severe sepsis --        Tissue hypoperfusion persists in the hour after crystalloid fluid administration, evidenced, by either: --        Was hypotension present within one hour of the conclusion of crystalloid fluid administration? --        Date of presentation of septic shock --        Time of presentation of septic shock --              User Key  (r) = Recorded By, (t) = Taken By, (c) = Cosigned By    [address] Name Provider Type    Kennedy Rkshahid  20 , DO Physician                              MDM  Number of Diagnoses or Management Options  UTI (urinary tract infection): new and requires workup  Diagnosis management comments: Patient with UTI with failed outpatient treatment  Will get septic workup, UA, ct abd/pel, US testicles, and give fluids/ abx/ admit  Patient reevaluated and feels improved  Patient updated on results of tests and plan of care including admission to hospital for further evaluation of presenting complaint  Patient demonstrates verbal understanding and agrees with plan  Report to Dr Augie Dubon  with [facility] for continuation of patient care          Amount and/or Complexity of Data Reviewed  Clinical lab tests: ordered and reviewed  Tests in the radiology section of CPT®: ordered and reviewed  Tests in the medicine section of CPT®: ordered and reviewed  Discussion of test results with the performing providers: yes  Decide to obtain previous medical records or to obtain history from someone other than the patient: yes  Obtain history from someone other than the patient: yes  Review and summarize past medical records: yes  Discuss the patient with other providers: yes  Independent visualization of images, tracings, or specimens: yes    Patient Progress  Patient progress: improved      Disposition  Final diagnoses:   UTI (urinary tract infection)     Time reflects when diagnosis was documented in both MDM as applicable and the Disposition within this note     Time User Action Codes Description Comment    6/19/2022  3:11 PM Ros VELASCO Add [N39 0] UTI (urinary tract infection)       ED Disposition     ED Disposition   Admit    Condition   Stable    Date/Time   Sun Jun 19, 2022  3:11 PM    Comment   Case was discussed with KILLIAN and the patient's admission status was agreed to be Admission Status: inpatient status to the service of Dr Keiko White   Follow-up Information    None         Patient's Medications   Discharge Prescriptions    No medications on file       No discharge procedures on file      PDMP Review     None          ED Provider  Electronically Signed by           Inocencio Gan DO  06/19/22 1526

## 2022-06-19 NOTE — ASSESSMENT & PLAN NOTE
Presented with sodium 133  No previous labs to compare baseline  Follow-up with urine sodium, urine osmolality  Continue IV hydration  Follow-up with BMP

## 2022-06-19 NOTE — ASSESSMENT & PLAN NOTE
Patient reports chewing tobacco as well as also reports using electric cigarettes  Counseled on cessation

## 2022-06-20 LAB
ALBUMIN SERPL BCP-MCNC: 2.7 G/DL (ref 3.5–5)
ALP SERPL-CCNC: 40 U/L (ref 46–116)
ALT SERPL W P-5'-P-CCNC: 20 U/L (ref 12–78)
ANION GAP SERPL CALCULATED.3IONS-SCNC: 5 MMOL/L (ref 4–13)
ANION GAP SERPL CALCULATED.3IONS-SCNC: 8 MMOL/L (ref 4–13)
AST SERPL W P-5'-P-CCNC: 14 U/L (ref 5–45)
BASOPHILS # BLD AUTO: 0.05 THOUSANDS/ΜL (ref 0–0.1)
BASOPHILS NFR BLD AUTO: 0 % (ref 0–1)
BILIRUB SERPL-MCNC: 0.84 MG/DL (ref 0.2–1)
BUN SERPL-MCNC: 12 MG/DL (ref 5–25)
BUN SERPL-MCNC: 9 MG/DL (ref 5–25)
CALCIUM ALBUM COR SERPL-MCNC: 9.2 MG/DL (ref 8.3–10.1)
CALCIUM SERPL-MCNC: 7.9 MG/DL (ref 8.3–10.1)
CALCIUM SERPL-MCNC: 8.2 MG/DL (ref 8.3–10.1)
CHLORIDE SERPL-SCNC: 106 MMOL/L (ref 100–108)
CHLORIDE SERPL-SCNC: 107 MMOL/L (ref 100–108)
CO2 SERPL-SCNC: 26 MMOL/L (ref 21–32)
CO2 SERPL-SCNC: 27 MMOL/L (ref 21–32)
CREAT SERPL-MCNC: 0.84 MG/DL (ref 0.6–1.3)
CREAT SERPL-MCNC: 0.91 MG/DL (ref 0.6–1.3)
EOSINOPHIL # BLD AUTO: 0.14 THOUSAND/ΜL (ref 0–0.61)
EOSINOPHIL NFR BLD AUTO: 1 % (ref 0–6)
ERYTHROCYTE [DISTWIDTH] IN BLOOD BY AUTOMATED COUNT: 13.8 % (ref 11.6–15.1)
GFR SERPL CREATININE-BSD FRML MDRD: 112 ML/MIN/1.73SQ M
GFR SERPL CREATININE-BSD FRML MDRD: 117 ML/MIN/1.73SQ M
GLUCOSE SERPL-MCNC: 100 MG/DL (ref 65–140)
GLUCOSE SERPL-MCNC: 117 MG/DL (ref 65–140)
HCT VFR BLD AUTO: 39.3 % (ref 36.5–49.3)
HGB BLD-MCNC: 13.1 G/DL (ref 12–17)
IMM GRANULOCYTES # BLD AUTO: 0.06 THOUSAND/UL (ref 0–0.2)
IMM GRANULOCYTES NFR BLD AUTO: 0 % (ref 0–2)
LYMPHOCYTES # BLD AUTO: 1.43 THOUSANDS/ΜL (ref 0.6–4.47)
LYMPHOCYTES NFR BLD AUTO: 9 % (ref 14–44)
MCH RBC QN AUTO: 29.8 PG (ref 26.8–34.3)
MCHC RBC AUTO-ENTMCNC: 33.3 G/DL (ref 31.4–37.4)
MCV RBC AUTO: 89 FL (ref 82–98)
MONOCYTES # BLD AUTO: 1.37 THOUSAND/ΜL (ref 0.17–1.22)
MONOCYTES NFR BLD AUTO: 9 % (ref 4–12)
NEUTROPHILS # BLD AUTO: 12.55 THOUSANDS/ΜL (ref 1.85–7.62)
NEUTS SEG NFR BLD AUTO: 81 % (ref 43–75)
NRBC BLD AUTO-RTO: 0 /100 WBCS
OSMOLALITY UR: 468 MMOL/KG
PLATELET # BLD AUTO: 210 THOUSANDS/UL (ref 149–390)
PMV BLD AUTO: 10.4 FL (ref 8.9–12.7)
POTASSIUM SERPL-SCNC: 4.2 MMOL/L (ref 3.5–5.3)
POTASSIUM SERPL-SCNC: 4.7 MMOL/L (ref 3.5–5.3)
PROCALCITONIN SERPL-MCNC: 0.53 NG/ML
PROT SERPL-MCNC: 6.6 G/DL (ref 6.4–8.2)
RBC # BLD AUTO: 4.4 MILLION/UL (ref 3.88–5.62)
SODIUM 24H UR-SCNC: 108 MOL/L
SODIUM SERPL-SCNC: 138 MMOL/L (ref 136–145)
SODIUM SERPL-SCNC: 141 MMOL/L (ref 136–145)
WBC # BLD AUTO: 15.6 THOUSAND/UL (ref 4.31–10.16)

## 2022-06-20 PROCEDURE — 85025 COMPLETE CBC W/AUTO DIFF WBC: CPT | Performed by: STUDENT IN AN ORGANIZED HEALTH CARE EDUCATION/TRAINING PROGRAM

## 2022-06-20 PROCEDURE — 99233 SBSQ HOSP IP/OBS HIGH 50: CPT | Performed by: NURSE PRACTITIONER

## 2022-06-20 PROCEDURE — 84145 PROCALCITONIN (PCT): CPT | Performed by: STUDENT IN AN ORGANIZED HEALTH CARE EDUCATION/TRAINING PROGRAM

## 2022-06-20 PROCEDURE — 80053 COMPREHEN METABOLIC PANEL: CPT | Performed by: STUDENT IN AN ORGANIZED HEALTH CARE EDUCATION/TRAINING PROGRAM

## 2022-06-20 PROCEDURE — 84300 ASSAY OF URINE SODIUM: CPT | Performed by: STUDENT IN AN ORGANIZED HEALTH CARE EDUCATION/TRAINING PROGRAM

## 2022-06-20 PROCEDURE — 83935 ASSAY OF URINE OSMOLALITY: CPT | Performed by: STUDENT IN AN ORGANIZED HEALTH CARE EDUCATION/TRAINING PROGRAM

## 2022-06-20 RX ADMIN — CEFTRIAXONE SODIUM 1000 MG: 10 INJECTION, POWDER, FOR SOLUTION INTRAVENOUS at 13:22

## 2022-06-20 RX ADMIN — DOXYCYCLINE 100 MG: 100 INJECTION, POWDER, LYOPHILIZED, FOR SOLUTION INTRAVENOUS at 03:05

## 2022-06-20 RX ADMIN — ENOXAPARIN SODIUM 40 MG: 40 INJECTION SUBCUTANEOUS at 08:33

## 2022-06-20 RX ADMIN — DOXYCYCLINE 100 MG: 100 INJECTION, POWDER, LYOPHILIZED, FOR SOLUTION INTRAVENOUS at 15:30

## 2022-06-20 NOTE — PLAN OF CARE
Problem: PAIN - ADULT  Goal: Verbalizes/displays adequate comfort level or baseline comfort level  Description: Interventions:  - Encourage patient to monitor pain and request assistance  - Assess pain using appropriate pain scale  - Administer analgesics based on type and severity of pain and evaluate response  - Implement non-pharmacological measures as appropriate and evaluate response  - Consider cultural and social influences on pain and pain management  - Notify physician/advanced practitioner if interventions unsuccessful or patient reports new pain  Outcome: Progressing     Problem: INFECTION - ADULT  Goal: Absence or prevention of progression during hospitalization  Description: INTERVENTIONS:  - Assess and monitor for signs and symptoms of infection  - Monitor lab/diagnostic results  - Monitor all insertion sites, i e  indwelling lines, tubes, and drains  - Monitor endotracheal if appropriate and nasal secretions for changes in amount and color  - [place] appropriate cooling/warming therapies per order  - Administer medications as ordered  - Instruct and encourage patient and family to use good hand hygiene technique  - Identify and instruct in appropriate isolation precautions for identified infection/condition  Outcome: Progressing     Problem: DISCHARGE PLANNING  Goal: Discharge to home or other facility with appropriate resources  Description: INTERVENTIONS:  - Identify barriers to discharge w/patient and caregiver  - Arrange for needed discharge resources and transportation as appropriate  - Identify discharge learning needs (meds, wound care, etc )  - Arrange for interpretive services to assist at discharge as needed  - Refer to Case Management Department for coordinating discharge planning if the patient needs post-hospital services based on physician/advanced practitioner order or complex needs related to functional status, cognitive ability, or social support system  Outcome: Progressing Problem: Knowledge Deficit  Goal: Patient/family/caregiver demonstrates understanding of disease process, treatment plan, medications, and discharge instructions  Description: Complete learning assessment and assess knowledge base    Interventions:  - Provide teaching at level of understanding  - Provide teaching via preferred learning methods  Outcome: Progressing

## 2022-06-20 NOTE — UTILIZATION REVIEW
Initial Clinical Review    Admission: Date/Time/Statement:   Admission Orders (From admission, onward)     Ordered        06/19/22 1511  Inpatient Admission  Once                      Orders Placed This Encounter   Procedures    Inpatient Admission     Standing Status:   Standing     Number of Occurrences:   1     Order Specific Question:   Level of Care     Answer:   Med Surg [16]     Order Specific Question:   Estimated length of stay     Answer:   More than 2 Midnights     Order Specific Question:   Certification     Answer:   I certify that inpatient services are medically necessary for this patient for a duration of greater than two midnights  See H&P and MD Progress Notes for additional information about the patient's course of treatment  ED Arrival Information     Expected   -    Arrival   6/19/2022 10:19    Acuity   Urgent            Means of arrival   Walk-In    Escorted by   Family Member    Service   Hospitalist    Admission type   Urgent            Arrival complaint   UTI SYMPTOMS           Chief Complaint   Patient presents with    Possible UTI     Pt c/o burning with urination, fever that started on Friday        Initial Presentation: 29 y o  male to ED from home w/ fever, dysuria, urinary urgency, hematuria, associated perirectal spasms for last 3 days  Patient reports that he was seen at urgent care center 3 days ago and was diagnosed with UTI and was discharged on Ceftin however reports persistent fever and without any relief to urinary symptoms  has 1 female sexual partner and recently tested neg for STD  CT and w cystitis component prostatitis   Renal US w/ jan varicoceles   Admitted IP status w/ severe sepsis , plan for IV ceftriaxone , GC /chlamydia , PCR pending , cont iv ceftriaxone , start iv doxycyline , cont IVF , pending ua and BC , cont supportive care   Na 133 f/u urine na , cont IVF and f/u BMP        Date:  6/20  Day 2: na improved to 141 suspect was likely in setting of volume depletion   Monitor IVF , enc po intake and BMP in am   Cont IV abx , cultures pending   Cont supportive care   ED Triage Vitals   Temperature Pulse Respirations Blood Pressure SpO2   06/19/22 1030 06/19/22 1030 06/19/22 1030 06/19/22 1030 06/19/22 1030   (!) 102 2 °F (39 °C) (!) 113 17 123/67 97 %      Temp Source Heart Rate Source Patient Position - Orthostatic VS BP Location FiO2 (%)   06/19/22 1030 06/19/22 1030 06/19/22 1030 06/19/22 1030 --   Oral Monitor Sitting Right arm       Pain Score       06/19/22 1301       No Pain          Wt Readings from Last 1 Encounters:   06/19/22 104 kg (230 lb)     Additional Vital Signs:   06/20/22 07:18:57 98 °F (36 7 °C) 71 19 122/58 79 95 % -- --   06/20/22 03:09:50 98 2 °F (36 8 °C) 64 -- -- -- 96 % -- --   06/19/22 22:27:17 -- 94 18 110/71 84 95 % None (Room air) --   06/19/22 22:08:13 100 9 °F (38 3 °C) Abnormal  93 18 123/69 87 93 % None (Room air) Lying   06/19/22 2154 98 7 °F (37 1 °C) 87 18 118/59 -- 98 % None (Room air) Lying   06/19/22 18:58:29 -- 89 18 143/76 98 97 % None (Room air) --   06/19/22 1600 98 9 °F (37 2 °C) 90 19 119/70 -- 98 % None (Room air) --   06/19/22 1301 99 °F (37 2 °C) 91 18 115/55 -- -- None (Room air) Lying       Pertinent Labs/Diagnostic Test Results:   US scrotum and testicles   Final Result by Baron Armando MD (06/19 1442)       Bilateral varicoceles  No evidence of testicular torsion  Workstation performed: [id number]         CT abdomen pelvis wo contrast   Final Result by Nidia Anderson MD (06/19 1311)      Circumferential bladder wall thickening and extensive surrounding fat stranding suspicious for cystitis  Concomitant prostamegaly may indicate prostatitis              Workstation performed: [id number]           Results from last 7 days   Lab Units 06/19/22  1256   SARS-COV-2  Negative     Results from last 7 days   Lab Units 06/20/22  0549 06/19/22  1246   WBC Thousand/uL 15 60* 23 21*   HEMOGLOBIN g/dL 13 1 14 0   HEMATOCRIT % 39 3 41 2   PLATELETS Thousands/uL 210 235   NEUTROS ABS Thousands/µL 12 55* 19 90*     Results from last 7 days   Lab Units 06/20/22  0549 06/19/22 2351 06/19/22 2024 06/19/22  1245   SODIUM mmol/L 141 138 137 133*   POTASSIUM mmol/L 4 7 4 2 3 9 4 3   CHLORIDE mmol/L 107 106 105 99*   CO2 mmol/L 26 27 27 26   ANION GAP mmol/L 8 5 5 8   BUN mg/dL 9 12 10 7   CREATININE mg/dL 0 84 0 91 0 97 1 11   EGFR ml/min/1 73sq m 117 112 104 88   CALCIUM mg/dL 8 2* 7 9* 7 7* 8 4     Results from last 7 days   Lab Units 06/20/22  0549 06/19/22  1245   AST U/L 14 14   ALT U/L 20 24   ALK PHOS U/L 40* 46   TOTAL PROTEIN g/dL 6 6 7 7   ALBUMIN g/dL 2 7* 3 3*   TOTAL BILIRUBIN mg/dL 0 84 1 45*     Results from last 7 days   Lab Units 06/20/22  0549 06/19/22 2351 06/19/22 2024 06/19/22  1245   GLUCOSE RANDOM mg/dL 100 117 129 106     Results from last 7 days   Lab Units 06/19/22  1246   PROTIME seconds 15 2*   INR  1 25*   PTT seconds 37     Results from last 7 days   Lab Units 06/20/22  0549 06/19/22  1246   PROCALCITONIN ng/ml 0 53* 0 68*     Results from last 7 days   Lab Units 06/19/22  1246   LACTIC ACID mmol/L 1 0     Results from last 7 days   Lab Units 06/20/22  0549   OSMO UR mmol/     Results from last 7 days   Lab Units 06/20/22  0549 06/19/22  1033   CLARITY UA   --  Clear   COLOR UA   --  [place]   SPEC GRAV UA   --  1 015   PH UA   --  5 0   GLUCOSE UA mg/dl  --  250 (1/4%)*   KETONES UA mg/dl  --  15 (1+)*   BLOOD UA   --  Trace-Intact*   PROTEIN UA mg/dl  --  >=300*   NITRITE UA   --  Positive*   BILIRUBIN UA   --  Negative   UROBILINOGEN UA E U /dl  --  >=8 0*   LEUKOCYTES UA   --  Trace*   WBC UA /hpf  --  4-10*   RBC UA /hpf  --  0-1   BACTERIA UA /hpf  --  Occasional   EPITHELIAL CELLS WET PREP /hpf  --  Occasional   SODIUM UR  108  --      Results from last 7 days   Lab Units 06/19/22  1256   INFLUENZA A PCR  Negative   INFLUENZA B PCR  Negative   RSV PCR  Negative Results from last 7 days   Lab Units 06/20/22  0003 06/19/22  1256   BLOOD CULTURE  Received in Microbiology Lab  Culture in Progress  Received in Microbiology Lab  Culture in Progress  ED Treatment:   Medication Administration from 06/19/2022 1018 to 06/19/2022 1833       Date/Time Order Dose Route Action     06/19/2022 1218 acetaminophen (TYLENOL) tablet 650 mg 650 mg Oral Given     06/19/2022 1257 sodium chloride 0 9 % bolus 1,000 mL 1,000 mL Intravenous New Bag     06/19/2022 1257 ketorolac (TORADOL) injection 15 mg 15 mg Intravenous Given     06/19/2022 1347 cefTRIAXone (ROCEPHIN) 2,000 mg in dextrose 5 % 50 mL IVPB 2,000 mg Intravenous New Bag     06/19/2022 1621 doxycycline (VIBRAMYCIN) 100 mg in sodium chloride 0 9 % 100 mL IVPB 100 mg Intravenous New Bag     06/19/2022 1719 sodium chloride 0 9 % bolus 1,000 mL 1,000 mL Intravenous New Bag     06/19/2022 1820 sodium chloride 0 9 % bolus 1,000 mL 1,000 mL Intravenous New Bag        Past Medical History:   Diagnosis Date    Asthma      Present on Admission:  **None**      Admitting Diagnosis: UTI (urinary tract infection) [N39 0]  UTI symptoms [R39 9]  Age/Sex: 29 y o  male  Admission Orders:  Scheduled Medications:  cefTRIAXone, 1,000 mg, Intravenous, Q24H  doxycycline, 100 mg, Intravenous, Q12H  enoxaparin, 40 mg, Subcutaneous, Daily      Continuous IV Infusions:     PRN Meds:  acetaminophen, 650 mg, Oral, Q6H PRN  oxyCODONE, 2 5 mg, Oral, Q4H PRN   Or  oxyCODONE, 5 mg, Oral, Q4H PRN      Reg diet     Network Utilization Review Department  ATTENTION: Please call with any questions or concerns to [phone] and carefully listen to the prompts so that you are directed to the right person  All voicemails are confidential   Radu Duarte all requests for admission clinical reviews, approved or denied determinations and any other requests to dedicated fax number below belonging to the campus where the patient is receiving treatment   List of dedicated fax numbers for the Facilities:  [address] [place] [address] DENIALS (Administrative/Medical Necessity) [phone]   [address] (Maternity/NICU/Pediatrics) 261 [facility],7[facility] Floor [facility] [address] [place] [address]  [address] [place] [address] [id number] [place] [address] [address] Lashawn Hines [address] [facility] [address] [facility] [phone]

## 2022-06-20 NOTE — UTILIZATION REVIEW
Inpatient Admission Authorization Request   NOTIFICATION OF INPATIENT ADMISSION/INPATIENT AUTHORIZATION REQUEST   SERVICING FACILITY:   [address] [place] [address]  Tax ID: [id number]  NPI: [id number]  Place of Service: Inpatient [address] [facility]  [address]  Place of Service Code: 24     ATTENDING PROVIDER:  Attending Name and NPI#: Maru Coleman [[id number]]  Address: [address] [place] [address]  Phone: [phone]     UTILIZATION REVIEW CONTACT:  Solomon Zimmerman Utilization   Network Utilization Review Department  Phone: [phone]  Fax [phone]  Email: [email or web address]     PHYSICIAN ADVISORY SERVICES:  FOR ATJL-GN-UTAM REVIEW - MEDICAL NECESSITY DENIAL  Phone: [phone]  Fax: [phone]  Email: [email or web address]     TYPE OF REQUEST:  Inpatient Status     ADMISSION INFORMATION:  ADMISSION DATE/TIME: 6/19/22  3:12 PM  PATIENT DIAGNOSIS CODE/DESCRIPTION:  UTI (urinary tract infection) [N39 0]  UTI symptoms [R39 9]  DISCHARGE DATE/TIME: No discharge date for patient encounter  IMPORTANT INFORMATION:  Please contact the Solomon Zimmerman directly with any questions or concerns regarding this request  Department voicemails are confidential     Send requests for admission clinical reviews, concurrent reviews, approvals, and administrative denials due to lack of clinical to fax [phone]

## 2022-06-20 NOTE — ASSESSMENT & PLAN NOTE
· GC/chlamydia PCR pending  · Continue ceftriaxone, and IV doxycycline empirically    · Patient has been having intermittent urinary symptoms for years with hesitancy occasional dysuria citing he has had STD testing that has all been negative and when he thinks on and he had symptoms since childhood  · Would treat underlying infection refer to urology as an outpatient

## 2022-06-20 NOTE — PLAN OF CARE
Problem: PAIN - ADULT  Goal: Verbalizes/displays adequate comfort level or baseline comfort level  Description: Interventions:  - Encourage patient to monitor pain and request assistance  - Assess pain using appropriate pain scale  - Administer analgesics based on type and severity of pain and evaluate response  - Implement non-pharmacological measures as appropriate and evaluate response  - Consider cultural and social influences on pain and pain management  - Notify physician/advanced practitioner if interventions unsuccessful or patient reports new pain  Outcome: Progressing     Problem: INFECTION - ADULT  Goal: Absence or prevention of progression during hospitalization  Description: INTERVENTIONS:  - Assess and monitor for signs and symptoms of infection  - Monitor lab/diagnostic results  - Monitor all insertion sites, i e  indwelling lines, tubes, and drains  - Monitor endotracheal if appropriate and nasal secretions for changes in amount and color  - [place] appropriate cooling/warming therapies per order  - Administer medications as ordered  - Instruct and encourage patient and family to use good hand hygiene technique  - Identify and instruct in appropriate isolation precautions for identified infection/condition  Outcome: Progressing  Goal: Absence of fever/infection during neutropenic period  Description: INTERVENTIONS:  - Monitor WBC    Outcome: Progressing     Problem: SAFETY ADULT  Goal: Patient will remain free of falls  Description: INTERVENTIONS:  - Educate patient/family on patient safety including physical limitations  - Instruct patient to call for assistance with activity   - Consult OT/PT to assist with strengthening/mobility   - Keep Call bell within reach  - Keep bed low and locked with side rails adjusted as appropriate  - Keep care items and personal belongings within reach  - Initiate and maintain comfort rounds  - Make Fall Risk Sign visible to staff  - Offer Toileting every 2 Hours, in advance of need  - Initiate/Maintain bedalarm  - Obtain necessary fall risk management equipment:   - Apply yellow socks and bracelet for high fall risk patients  - Consider moving patient to room near nurses station  Outcome: Progressing  Goal: Maintain or return to baseline ADL function  Description: INTERVENTIONS:  -  Assess patient's ability to carry out ADLs; assess patient's baseline for ADL function and identify physical deficits which impact ability to perform ADLs (bathing, care of mouth/teeth, toileting, grooming, dressing, etc )  - Assess/evaluate cause of self-care deficits   - Assess range of motion  - Assess patient's mobility; develop plan if impaired  - Assess patient's need for assistive devices and provide as appropriate  - Encourage maximum independence but intervene and supervise when necessary  - Involve family in performance of ADLs  - Assess for home care needs following discharge   - Consider OT consult to assist with ADL evaluation and planning for discharge  - Provide patient education as appropriate  Outcome: Progressing  Goal: Maintains/Returns to pre admission functional level  Description: INTERVENTIONS:  - Perform BMAT or MOVE assessment daily    - Set and communicate daily mobility goal to care team and patient/family/caregiver  - Collaborate with rehabilitation services on mobility goals if consulted  - Perform Range of Motion 3 times a day  - Reposition patient every 2 hours    - Dangle patient  times a day  - Stand patient 3 times a day  - Ambulate patient 3 times a day  - Out of bed to chair 3 times a day   - Out of bed for meals 3 times a day  - Out of bed for toileting  - Record patient progress and toleration of activity level   Outcome: Progressing     Problem: DISCHARGE PLANNING  Goal: Discharge to home or other facility with appropriate resources  Description: INTERVENTIONS:  - Identify barriers to discharge w/patient and caregiver  - Arrange for needed discharge resources and transportation as appropriate  - Identify discharge learning needs (meds, wound care, etc )  - Arrange for interpretive services to assist at discharge as needed  - Refer to Case Management Department for coordinating discharge planning if the patient needs post-hospital services based on physician/advanced practitioner order or complex needs related to functional status, cognitive ability, or social support system  Outcome: Progressing     Problem: Knowledge Deficit  Goal: Patient/family/caregiver demonstrates understanding of disease process, treatment plan, medications, and discharge instructions  Description: Complete learning assessment and assess knowledge base    Interventions:  - Provide teaching at level of understanding  - Provide teaching via preferred learning methods  Outcome: Progressing     Problem: GENITOURINARY - ADULT  Goal: Maintains or returns to baseline urinary function  Description: INTERVENTIONS:  - Assess urinary function  - Encourage oral fluids to ensure adequate hydration if ordered  - Administer IV fluids as ordered to ensure adequate hydration  - Administer ordered medications as needed  - Offer frequent toileting  - Follow urinary retention protocol if ordered  Outcome: Progressing  Goal: Absence of urinary retention  Description: INTERVENTIONS:  - Assess patients ability to void and empty bladder  - Monitor I/O  - Bladder scan as needed  - Discuss with physician/AP medications to alleviate retention as needed  - Discuss catheterization for long term situations as appropriate  Outcome: Progressing  Goal: Urinary catheter remains patent  Description: INTERVENTIONS:  - Assess patency of urinary catheter  - If patient has a chronic briscoe, consider changing catheter if non-functioning  - Follow guidelines for intermittent irrigation of non-functioning urinary catheter  Outcome: Progressing     Problem: METABOLIC, FLUID AND ELECTROLYTES - ADULT  Goal: Electrolytes maintained within normal limits  Description: INTERVENTIONS:  - Monitor labs and assess patient for signs and symptoms of electrolyte imbalances  - Administer electrolyte replacement as ordered  - Monitor response to electrolyte replacements, including repeat lab results as appropriate  - Instruct patient on fluid and nutrition as appropriate  Outcome: Progressing  Goal: Fluid balance maintained  Description: INTERVENTIONS:  - Monitor labs   - Monitor I/O and WT  - Instruct patient on fluid and nutrition as appropriate  - Assess for signs & symptoms of volume excess or deficit  Outcome: Progressing  Goal: Glucose maintained within target range  Description: INTERVENTIONS:  - Monitor Blood Glucose as ordered  - Assess for signs and symptoms of hyperglycemia and hypoglycemia  - Administer ordered medications to maintain glucose within target range  - Assess nutritional intake and initiate nutrition service referral as needed  Outcome: Progressing

## 2022-06-20 NOTE — ASSESSMENT & PLAN NOTE
29year old male patient with past medical history of asthma, patient was seen at urgent care center 2 days ago for urinary symptoms and was started on Ceftin for cystitis however presents to [facility] Emergency room complaining of fever and not improving urinary symptoms  Sexually active with 1 partner  Reports recently tested negative for STD  · Presented on admission with fever, tachycardia, leukocytosis, bilirubinemia, elevated procalcitonin secondary to cystitis as well as prostatitis  · COVID-19 negative  · CT abdomen pelvis report noted with cystitis component prostatitis  · Renal ultrasound report noted with bilateral varicoceles, no testicular torsion  · Did receive IV ceftriaxone in ED continued on admission and IV doxycycline empirically for prostatitis   · GC/chlamydia PCR pending  · Continue with IV fluids  · Urine culture pending continue to trend   · Blood cultures x 2 pending continue to trend  · Leukocytosis improving, currently afebrile, bilirubin normalized, procalcitonin improved slightly  · Continue supportive care

## 2022-06-20 NOTE — ASSESSMENT & PLAN NOTE
· Presented with sodium 133  · Now normalized at 141  · Suspect likely in setting of volume depletion  · No previous labs to compare baseline  · Follow-up with urine sodium, urine osmolality  · Monitor off IV fluids  · Encourage p o  Intake  · BMP in a m

## 2022-06-20 NOTE — PROGRESS NOTES
[address] [facility]  Progress Note - Alexei Rivera 1990, 29 y o  male MRN: [id number]  Unit/Bed#: [facility] 87 432-02 Encounter: [id number]  Primary Care Provider: Eliseo Silva DO   Date and time admitted to hospital: 6/19/2022 11:04 AM    * Severe sepsis [facility])  Assessment & Plan  29year old male patient with past medical history of asthma, patient was seen at urgent care center 2 days ago for urinary symptoms and was started on Ceftin for cystitis however presents to [facility] Emergency room complaining of fever and not improving urinary symptoms  Sexually active with 1 partner  Reports recently tested negative for STD  · Presented on admission with fever, tachycardia, leukocytosis, bilirubinemia, elevated procalcitonin secondary to cystitis as well as prostatitis  · COVID-19 negative  · CT abdomen pelvis report noted with cystitis component prostatitis  · Renal ultrasound report noted with bilateral varicoceles, no testicular torsion  · Did receive IV ceftriaxone in ED continued on admission and IV doxycycline empirically for prostatitis   · GC/chlamydia PCR pending  · Continue with IV fluids  · Urine culture pending continue to trend   · Blood cultures x 2 pending continue to trend  · Leukocytosis improving, currently afebrile, bilirubin normalized, procalcitonin improved slightly  · Continue supportive care  Prostatitis  Assessment & Plan  · GC/chlamydia PCR pending  · Continue ceftriaxone, and IV doxycycline empirically  · Patient has been having intermittent urinary symptoms for years with hesitancy occasional dysuria citing he has had STD testing that has all been negative and when he thinks on and he had symptoms since childhood  · Would treat underlying infection refer to urology as an outpatient      Tobacco chew use  Assessment & Plan  · Patient reports chewing tobacco as well as also reports using electric cigarettes    · Counseled on cessation  Hyponatremia  Assessment & Plan  · Presented with sodium 133  · Now normalized at 141  · Suspect likely in setting of volume depletion  · No previous labs to compare baseline  · Follow-up with urine sodium, urine osmolality  · Monitor off IV fluids  · Encourage p o  Intake  · BMP in a m  VTE Pharmacologic Prophylaxis: VTE Score: 0 Low Risk (Score 0-2) - Encourage Ambulation  Patient Centered Rounds: I performed bedside rounds with nursing staff today  Discussions with Specialists or Other Care Team Provider:      Education and Discussions with Family / Patient: Patient declined call to   Time Spent for Care: 35 minutes  More than 50% of total time spent on counseling and coordination of care as described above  Current Length of Stay: 1 day(s)  Current Patient Status: Inpatient   Certification Statement: The patient will continue to require additional inpatient hospital stay due to Sepsis in setting of UTI prostatitis  Discharge Plan: Anticipate discharge in 24-48 hrs to home  Code Status: Level 1 - Full Code    Subjective:   Patient currently denying fever reporting slight discomfort when urinating with a heaviness type feeling but improved from previous symptoms  Patient reports on thinking about this he has been having these symptoms since childhood but never to this extent  Patient reports he finds that he tends to get other infections whenever he has burning with urination and a sense of sore throats upper respiratory infection and was not sure if he was fighting multiple infections at 1 time  Patient has had multiple STD workup set of been negative add a urine culture done that did show growth 3 coli at [facility] but is not in the epic record    Patient is agreeable to staying for culture results further treatment gets and he still symptomatic and agrees after much discussion and given he has been dealing with the symptoms to be evaluated by Urology after infection treatment as an outpatient  Objective:     Vitals:   Temp (24hrs), Av °F (37 2 °C), Min:98 °F (36 7 °C), Max:100 9 °F (38 3 °C)    Temp:  [98 °F (36 7 °C)-100 9 °F (38 3 °C)] 98 °F (36 7 °C)  HR:  [64-94] 71  Resp:  [18-19] 19  BP: (110-143)/(55-76) 122/58  SpO2:  [93 %-98 %] 95 %  Body mass index is 32 08 kg/m²  Input and Output Summary (last 24 hours): Intake/Output Summary (Last 24 hours) at 2022 1127  Last data filed at 2022 0305  Gross per 24 hour   Intake 3730 ml   Output 200 ml   Net 3530 ml       Physical Exam:   Physical Exam  Vitals and nursing note reviewed  Constitutional:       Appearance: He is well-developed  HENT:      Head: Normocephalic and atraumatic  Eyes:      Conjunctiva/sclera: Conjunctivae normal    Cardiovascular:      Rate and Rhythm: Normal rate and regular rhythm  Heart sounds: No murmur heard  Pulmonary:      Effort: Pulmonary effort is normal  No respiratory distress  Breath sounds: Normal breath sounds  Abdominal:      Palpations: Abdomen is soft  Tenderness: There is no abdominal tenderness  Musculoskeletal:         General: Normal range of motion  Cervical back: Neck supple  Skin:     General: Skin is warm and dry  Neurological:      General: No focal deficit present  Mental Status: He is alert and oriented to person, place, and time     Psychiatric:         Mood and Affect: Mood normal          Behavior: Behavior normal           Additional Data:     Labs:  Results from last 7 days   Lab Units 22  0549   WBC Thousand/uL 15 60*   HEMOGLOBIN g/dL 13 1   HEMATOCRIT % 39 3   PLATELETS Thousands/uL 210   NEUTROS PCT % 81*   LYMPHS PCT % 9*   MONOS PCT % 9   EOS PCT % 1     Results from last 7 days   Lab Units 22  0549   SODIUM mmol/L 141   POTASSIUM mmol/L 4 7   CHLORIDE mmol/L 107   CO2 mmol/L 26   BUN mg/dL 9   CREATININE mg/dL 0 84   ANION GAP mmol/L 8   CALCIUM mg/dL 8 2*   ALBUMIN g/dL 2 7*   TOTAL BILIRUBIN mg/dL 0 84   ALK PHOS U/L 40*   ALT U/L 20   AST U/L 14   GLUCOSE RANDOM mg/dL 100     Results from last 7 days   Lab Units 06/19/22  1246   INR  1 25*             Results from last 7 days   Lab Units 06/20/22  0549 06/19/22  1246   LACTIC ACID mmol/L  --  1 0   PROCALCITONIN ng/ml 0 53* 0 68*       Lines/Drains:  Invasive Devices  Report    Peripheral Intravenous Line  Duration           Peripheral IV 06/19/22 Left Antecubital <1 day                      Imaging: Reviewed radiology reports from this admission including: abdominal/pelvic CT    Recent Cultures (last 7 days):   Results from last 7 days   Lab Units 06/20/22  0003 06/19/22  1256   BLOOD CULTURE  Received in Microbiology Lab  Culture in Progress  Received in Microbiology Lab  Culture in Progress  Last 24 Hours Medication List:   Current Facility-Administered Medications   Medication Dose Route Frequency Provider Last Rate    acetaminophen  650 mg Oral Q6H PRN Nikhil HARDWICK MD      cefTRIAXone  1,000 mg Intravenous Q24H Nikhil HARDWICK MD      doxycycline  100 mg Intravenous Q12H Nikhil HARDWICK  mg (06/20/22 0305)    enoxaparin  40 mg Subcutaneous Daily Nikhil HARDWICK MD      oxyCODONE  2 5 mg Oral Q4H PRN Slava Harrison MD      Or    oxyCODONE  5 mg Oral Q4H PRN Slava Harrison MD          Today, Patient Was Seen By: IMMANUEL Martinez    **Please Note: This note may have been constructed using a voice recognition system  **

## 2022-06-20 NOTE — ASSESSMENT & PLAN NOTE
· Patient reports chewing tobacco as well as also reports using electric cigarettes  · Counseled on cessation

## 2022-06-20 NOTE — CASE MANAGEMENT
Case Management Progress Note    Patient name Justina Orellana  Location [facility] 87 432/-03 MRN [id number]  : 1990 Date 2022       LOS (days): 1  Geometric Mean LOS (GMLOS) (days):   Days to GMLOS:        OBJECTIVE:        Current admission status: Inpatient  Preferred Pharmacy: No Pharmacies Listed  Primary Care Provider: Carson Mejia DO    Primary Insurance: BLUE CROSS  Secondary Insurance:     PROGRESS NOTE:    Per chart review, patient was a self and IPTA  Patient has a PCP  No CM needs identified at this time  CM department will continue to follow patient through discharge

## 2022-06-20 NOTE — QUICK NOTE
Discussed with Patient and daughter at bedside results of duplex study of right lower extremity nonocclusive DVT  Again was discussed with patient and daughter on findings at this point if wanting to treat and at this point patient would not want to treat and would advocate more for wanting to move around with her family  Would encourage ongoing discussion and if patient would decided against hospice than full treatment would need to be discussed further     Would recommend ongoing discussion based on hospice plan

## 2022-06-21 VITALS
WEIGHT: 230 LBS | OXYGEN SATURATION: 98 % | SYSTOLIC BLOOD PRESSURE: 121 MMHG | TEMPERATURE: 99 F | HEIGHT: 71 IN | RESPIRATION RATE: 19 BRPM | HEART RATE: 70 BPM | BODY MASS INDEX: 32.2 KG/M2 | DIASTOLIC BLOOD PRESSURE: 74 MMHG

## 2022-06-21 PROBLEM — E87.1 HYPONATREMIA: Status: RESOLVED | Noted: 2022-06-19 | Resolved: 2022-06-21

## 2022-06-21 LAB
ANION GAP SERPL CALCULATED.3IONS-SCNC: 9 MMOL/L (ref 4–13)
BACTERIA UR CULT: NORMAL
BUN SERPL-MCNC: 9 MG/DL (ref 5–25)
C TRACH DNA SPEC QL NAA+PROBE: NEGATIVE
CALCIUM SERPL-MCNC: 8.6 MG/DL (ref 8.3–10.1)
CHLORIDE SERPL-SCNC: 104 MMOL/L (ref 100–108)
CHOLEST SERPL-MCNC: 151 MG/DL
CO2 SERPL-SCNC: 27 MMOL/L (ref 21–32)
CREAT SERPL-MCNC: 0.84 MG/DL (ref 0.6–1.3)
ERYTHROCYTE [DISTWIDTH] IN BLOOD BY AUTOMATED COUNT: 13.8 % (ref 11.6–15.1)
GFR SERPL CREATININE-BSD FRML MDRD: 117 ML/MIN/1.73SQ M
GLUCOSE SERPL-MCNC: 104 MG/DL (ref 65–140)
HCT VFR BLD AUTO: 42.3 % (ref 36.5–49.3)
HDLC SERPL-MCNC: 27 MG/DL
HGB BLD-MCNC: 13.9 G/DL (ref 12–17)
LDLC SERPL CALC-MCNC: 106 MG/DL (ref 0–100)
MCH RBC QN AUTO: 29.3 PG (ref 26.8–34.3)
MCHC RBC AUTO-ENTMCNC: 32.9 G/DL (ref 31.4–37.4)
MCV RBC AUTO: 89 FL (ref 82–98)
N GONORRHOEA DNA SPEC QL NAA+PROBE: NEGATIVE
NONHDLC SERPL-MCNC: 124 MG/DL
PLATELET # BLD AUTO: 285 THOUSANDS/UL (ref 149–390)
PMV BLD AUTO: 10 FL (ref 8.9–12.7)
POTASSIUM SERPL-SCNC: 3.9 MMOL/L (ref 3.5–5.3)
PROCALCITONIN SERPL-MCNC: 0.25 NG/ML
RBC # BLD AUTO: 4.75 MILLION/UL (ref 3.88–5.62)
SODIUM SERPL-SCNC: 140 MMOL/L (ref 136–145)
TRIGL SERPL-MCNC: 88 MG/DL
WBC # BLD AUTO: 7.09 THOUSAND/UL (ref 4.31–10.16)

## 2022-06-21 PROCEDURE — 80048 BASIC METABOLIC PNL TOTAL CA: CPT | Performed by: NURSE PRACTITIONER

## 2022-06-21 PROCEDURE — 85027 COMPLETE CBC AUTOMATED: CPT | Performed by: NURSE PRACTITIONER

## 2022-06-21 PROCEDURE — 80061 LIPID PANEL: CPT | Performed by: FAMILY MEDICINE

## 2022-06-21 PROCEDURE — 84145 PROCALCITONIN (PCT): CPT | Performed by: NURSE PRACTITIONER

## 2022-06-21 PROCEDURE — 99239 HOSP IP/OBS DSCHRG MGMT >30: CPT | Performed by: PHYSICIAN ASSISTANT

## 2022-06-21 RX ORDER — CIPROFLOXACIN 500 MG/1
500 TABLET, FILM COATED ORAL EVERY 12 HOURS SCHEDULED
Qty: 24 TABLET | Refills: 0 | Status: SHIPPED | OUTPATIENT
Start: 2022-06-21 | End: 2022-07-03

## 2022-06-21 RX ORDER — DOXYCYCLINE 100 MG/1
100 TABLET ORAL 2 TIMES DAILY
Refills: 0 | Status: CANCELLED | OUTPATIENT
Start: 2022-06-21

## 2022-06-21 RX ORDER — DOXYCYCLINE 100 MG/1
100 TABLET ORAL 2 TIMES DAILY
Qty: 24 TABLET | Refills: 0 | Status: SHIPPED | OUTPATIENT
Start: 2022-06-21 | End: 2022-07-03

## 2022-06-21 RX ADMIN — DOXYCYCLINE 100 MG: 100 INJECTION, POWDER, LYOPHILIZED, FOR SOLUTION INTRAVENOUS at 03:41

## 2022-06-21 RX ADMIN — ENOXAPARIN SODIUM 40 MG: 40 INJECTION SUBCUTANEOUS at 08:41

## 2022-06-21 RX ADMIN — ACETAMINOPHEN 650 MG: 325 TABLET, FILM COATED ORAL at 10:43

## 2022-06-21 NOTE — ASSESSMENT & PLAN NOTE
29year old male patient with past medical history of asthma, patient was seen at urgent care center 2 days ago for urinary symptoms and was started on Ceftin for cystitis however presents to [facility] Emergency room complaining of fever and not improving urinary symptoms  Sexually active with 1 partner  Reports recently tested negative for STD  · Presented on admission with fever, tachycardia, leukocytosis, bilirubinemia, elevated procalcitonin secondary to cystitis as well as prostatitis  · COVID-19 negative  · CT abdomen pelvis report noted with cystitis component prostatitis  · Renal ultrasound report noted with bilateral varicoceles, no testicular torsion  · Did receive IV ceftriaxone in ED continued on admission and IV doxycycline empirically for prostatitis   · GC/chlamydia PCR pending  · Urine culture negative for growth however likely due to being on antibiotics prior to admission would continue treatment  · Doxycycline for possible STI  · Cipro for acute bacterial prostatitis  · Can discontinue doxy after discharge if GC/chlamydia negative  · Blood cultures x 2 pending negative for growth at 24 hours  · Leukocytosis improving, currently afebrile, bilirubin normalized, procalcitonin improved slightly  · Repeat cbc pending  · Continue supportive care

## 2022-06-21 NOTE — ASSESSMENT & PLAN NOTE
· GC/chlamydia PCR pending  · Continue ceftriaxone, and IV doxycycline empirically    · Patient has been having intermittent urinary symptoms for years with hesitancy occasional dysuria citing he has had STD testing that has all been negative and when he thinks about the symptoms has had intermittently since childhood  · Would treat underlying infection refer to urology outpatient

## 2022-06-21 NOTE — DISCHARGE SUMMARY
[address] [facility]  SL Discharge- Arlene Samaniego 1990, 29 y o  male MRN: [id number]  Unit/Bed#: [facility] 87 432-02 Encounter: [id number]  Primary Care Provider: Sai Munguia DO   Date and time admitted to hospital: 6/19/2022 11:04 AM    * Severe sepsis [facility])  Assessment & Plan  29year old male patient with past medical history of asthma, patient was seen at urgent care center 2 days ago for urinary symptoms and was started on Ceftin for cystitis however presents to [facility] Emergency room complaining of fever and not improving urinary symptoms  Sexually active with 1 partner  Reports recently tested negative for STD  · Presented on admission with fever, tachycardia, leukocytosis, bilirubinemia, elevated procalcitonin secondary to cystitis as well as prostatitis  · COVID-19 negative  · CT abdomen pelvis report noted with cystitis component prostatitis  · Renal ultrasound report noted with bilateral varicoceles, no testicular torsion  · Did receive IV ceftriaxone in ED continued on admission and IV doxycycline empirically for prostatitis   · GC/chlamydia PCR pending  · Urine culture negative for growth however likely due to being on antibiotics prior to admission would continue treatment  · Doxycycline for possible STI  · Cipro for acute bacterial prostatitis  · Can discontinue doxy after discharge if GC/chlamydia negative  · Blood cultures x 2 pending negative for growth at 24 hours  · Leukocytosis improving, currently afebrile, bilirubin normalized, procalcitonin improved slightly  · Repeat cbc pending  · Continue supportive care  Prostatitis  Assessment & Plan  · GC/chlamydia PCR pending  · Continue ceftriaxone, and IV doxycycline empirically    · Patient has been having intermittent urinary symptoms for years with hesitancy occasional dysuria citing he has had STD testing that has all been negative and when he thinks about the symptoms has had intermittently since childhood  · Would treat underlying infection refer to urology outpatient      Hyponatremia-resolved as of 6/21/2022  Assessment & Plan  · Presented with sodium 133  · Now normalized at 141  · Suspect likely in setting of volume depletion  · No previous labs to compare baseline  Tobacco chew use  Assessment & Plan  · Patient reports chewing tobacco as well as also reports using electric cigarettes  · Counseled on cessation  Medical Problems             Resolved Problems  Date Reviewed: 6/21/2022          Resolved    Hyponatremia 6/21/2022     Resolved by  Zach Chapin PA-C              Discharging Physician / Practitioner: Zach Chapin PA-C  PCP: Carson Notice,   Admission Date:   Admission Orders (From admission, onward)     Ordered        06/19/22 1511  Inpatient Admission  Once                      Discharge Date: 06/21/22    Consultations During Hospital Stay:  · None     Procedures Performed:   · None     Significant Findings / Test Results:   US scrotum and testicles   Final Result by Michelle Harris MD (06/19 1442)       Bilateral varicoceles  No evidence of testicular torsion  Workstation performed: [id number]         CT abdomen pelvis wo contrast   Final Result by Montserrat White MD (06/19 1311)      Circumferential bladder wall thickening and extensive surrounding fat stranding suspicious for cystitis  Concomitant prostamegaly may indicate prostatitis  Workstation performed: [id number]               Incidental Findings:   · None      Test Results Pending at Discharge (will require follow up):   · GC/chlamydia  · Final blood cultures     Outpatient Tests Requested:  · Urology referral  · PCP referral    Complications:  None     Reason for Admission: sepsis secondary to prostatitis     Hospital Course:   Justina Orellana is a 29 y o  male patient who originally presented to the hospital on 6/19/2022 due to dysuria and frequency with fever    Patient recently diagnosed with E  Coli UTI at urgent care and reports recently being tested for STI which was negative, however only 3 days after sexual contact  Patient found to have sepsis with CT findings supportive of prostatitis  Treated for potential STI with doxycycline and ceftriaxone with improvement  Will continue doxy on discharge and prescribe cipro  Referral to urology outpatient for follow up  Patient will establish with new PCP, referral given  Advised to return to ER for any worsening symptoms, inability to pass urine, bloody urine, etc   Provided with information on antibiotics prescribed at discharge  Please see above list of diagnoses and related plan for additional information  Condition at Discharge: good    Discharge Day Visit / Exam:   Subjective:  Patient seen this am, reports feeling better  No fevers or chills  No pain presently  We discussed need for continued antibiotics on discharge, safe sexual contact measures, and pending labs to follow up on  Vitals: Blood Pressure: 121/74 (06/21/22 0739)  Pulse: 70 (06/21/22 0739)  Temperature: 99 °F (37 2 °C) (06/20/22 2208)  Temp Source: Oral (06/19/22 2208)  Respirations: 19 (06/21/22 0739)  Height: 5' 11" (180 3 cm) (06/19/22 1030)  Weight - Scale: 104 kg (230 lb) (06/19/22 1030)  SpO2: 98 % (06/21/22 0739)  Exam:   Physical Exam  Vitals and nursing note reviewed  Constitutional:       General: He is not in acute distress  Appearance: He is not ill-appearing, toxic-appearing or diaphoretic  Cardiovascular:      Rate and Rhythm: Normal rate  Pulmonary:      Effort: Pulmonary effort is normal  No respiratory distress  Breath sounds: No wheezing  Musculoskeletal:      Right lower leg: No edema  Left lower leg: No edema  Skin:     Coloration: Skin is not pale  Neurological:      Mental Status: He is alert and oriented to person, place, and time     Psychiatric:         Mood and Affect: Mood normal  Behavior: Behavior normal           Discussion with Family: Patient declined call to   Discharge instructions/Information to patient and family:   See after visit summary for information provided to patient and family  Provisions for Follow-Up Care:  See after visit summary for information related to follow-up care and any pertinent home health orders  Disposition:   Home    Planned Readmission: none     Discharge Statement:  I spent >40 minutes discharging the patient  This time was spent on the day of discharge  I had direct contact with the patient on the day of discharge  Greater than 50% of the total time was spent examining patient, answering all patient questions, arranging and discussing plan of care with patient as well as directly providing post-discharge instructions  Additional time then spent on discharge activities  Discharge Medications:  See after visit summary for reconciled discharge medications provided to patient and/or family        **Please Note: This note may have been constructed using a voice recognition system**

## 2022-06-21 NOTE — ASSESSMENT & PLAN NOTE
· Presented with sodium 133  · Now normalized at 141  · Suspect likely in setting of volume depletion  · No previous labs to compare baseline

## 2022-06-21 NOTE — PLAN OF CARE
Problem: PAIN - ADULT  Goal: Verbalizes/displays adequate comfort level or baseline comfort level  Description: Interventions:  - Encourage patient to monitor pain and request assistance  - Assess pain using appropriate pain scale  - Administer analgesics based on type and severity of pain and evaluate response  - Implement non-pharmacological measures as appropriate and evaluate response  - Consider cultural and social influences on pain and pain management  - Notify physician/advanced practitioner if interventions unsuccessful or patient reports new pain  Outcome: Progressing     Problem: INFECTION - ADULT  Goal: Absence or prevention of progression during hospitalization  Description: INTERVENTIONS:  - Assess and monitor for signs and symptoms of infection  - Monitor lab/diagnostic results  - Monitor all insertion sites, i e  indwelling lines, tubes, and drains  - Monitor endotracheal if appropriate and nasal secretions for changes in amount and color  - [place] appropriate cooling/warming therapies per order  - Administer medications as ordered  - Instruct and encourage patient and family to use good hand hygiene technique  - Identify and instruct in appropriate isolation precautions for identified infection/condition  Outcome: Progressing  Goal: Absence of fever/infection during neutropenic period  Description: INTERVENTIONS:  - Monitor WBC    Outcome: Progressing     Problem: SAFETY ADULT  Goal: Patient will remain free of falls  Description: INTERVENTIONS:  - Educate patient/family on patient safety including physical limitations  - Instruct patient to call for assistance with activity   - Consult OT/PT to assist with strengthening/mobility   - Keep Call bell within reach  - Keep bed low and locked with side rails adjusted as appropriate  - Keep care items and personal belongings within reach  - Initiate and maintain comfort rounds  - Make Fall Risk Sign visible to staff  - Offer Toileting every 2 Hours, in advance of need  - Initiate/Maintain bed alarm  - Obtain necessary fall risk management equipment  - Apply yellow socks and bracelet for high fall risk patients  - Consider moving patient to room near nurses station  Outcome: Progressing  Goal: Maintain or return to baseline ADL function  Description: INTERVENTIONS:  -  Assess patient's ability to carry out ADLs; assess patient's baseline for ADL function and identify physical deficits which impact ability to perform ADLs (bathing, care of mouth/teeth, toileting, grooming, dressing, etc )  - Assess/evaluate cause of self-care deficits   - Assess range of motion  - Assess patient's mobility; develop plan if impaired  - Assess patient's need for assistive devices and provide as appropriate  - Encourage maximum independence but intervene and supervise when necessary  - Involve family in performance of ADLs  - Assess for home care needs following discharge   - Consider OT consult to assist with ADL evaluation and planning for discharge  - Provide patient education as appropriate  Outcome: Progressing  Goal: Maintains/Returns to pre admission functional level  Description: INTERVENTIONS:  - Perform BMAT or MOVE assessment daily    - Set and communicate daily mobility goal to care team and patient/family/caregiver  - Collaborate with rehabilitation services on mobility goals if consulted  - Perform Range of Motion 3 times a day  - Reposition patient every 2 hours    - Dangle patient 3 times a day  - Stand patient 3 times a day  - Ambulate patient 3 times a day  - Out of bed to chair 3 times a day   - Out of bed for meals 3 times a day  - Out of bed for toileting  - Record patient progress and toleration of activity level   Outcome: Progressing     Problem: DISCHARGE PLANNING  Goal: Discharge to home or other facility with appropriate resources  Description: INTERVENTIONS:  - Identify barriers to discharge w/patient and caregiver  - Arrange for needed discharge resources and transportation as appropriate  - Identify discharge learning needs (meds, wound care, etc )  - Arrange for interpretive services to assist at discharge as needed  - Refer to Case Management Department for coordinating discharge planning if the patient needs post-hospital services based on physician/advanced practitioner order or complex needs related to functional status, cognitive ability, or social support system  Outcome: Progressing     Problem: Knowledge Deficit  Goal: Patient/family/caregiver demonstrates understanding of disease process, treatment plan, medications, and discharge instructions  Description: Complete learning assessment and assess knowledge base    Interventions:  - Provide teaching at level of understanding  - Provide teaching via preferred learning methods  Outcome: Progressing     Problem: GENITOURINARY - ADULT  Goal: Maintains or returns to baseline urinary function  Description: INTERVENTIONS:  - Assess urinary function  - Encourage oral fluids to ensure adequate hydration if ordered  - Administer IV fluids as ordered to ensure adequate hydration  - Administer ordered medications as needed  - Offer frequent toileting  - Follow urinary retention protocol if ordered  Outcome: Progressing  Goal: Absence of urinary retention  Description: INTERVENTIONS:  - Assess patients ability to void and empty bladder  - Monitor I/O  - Bladder scan as needed  - Discuss with physician/AP medications to alleviate retention as needed  - Discuss catheterization for long term situations as appropriate  Outcome: Progressing  Goal: Urinary catheter remains patent  Description: INTERVENTIONS:  - Assess patency of urinary catheter  - If patient has a chronic briscoe, consider changing catheter if non-functioning  - Follow guidelines for intermittent irrigation of non-functioning urinary catheter  Outcome: Progressing     Problem: METABOLIC, FLUID AND ELECTROLYTES - ADULT  Goal: Electrolytes maintained within normal limits  Description: INTERVENTIONS:  - Monitor labs and assess patient for signs and symptoms of electrolyte imbalances  - Administer electrolyte replacement as ordered  - Monitor response to electrolyte replacements, including repeat lab results as appropriate  - Instruct patient on fluid and nutrition as appropriate  Outcome: Progressing  Goal: Fluid balance maintained  Description: INTERVENTIONS:  - Monitor labs   - Monitor I/O and WT  - Instruct patient on fluid and nutrition as appropriate  - Assess for signs & symptoms of volume excess or deficit  Outcome: Progressing  Goal: Glucose maintained within target range  Description: INTERVENTIONS:  - Monitor Blood Glucose as ordered  - Assess for signs and symptoms of hyperglycemia and hypoglycemia  - Administer ordered medications to maintain glucose within target range  - Assess nutritional intake and initiate nutrition service referral as needed  Outcome: Progressing

## 2022-06-21 NOTE — PROGRESS NOTES
All discharge instructions reviewed with pt  Junior removed  Telemetry removed  Scripts called to pharmacy by MD Chris Santoyo removed  Pt discharged  Stated ride will arrive at 1630

## 2022-06-22 LAB
ATRIAL RATE: 89 BPM
P AXIS: 70 DEGREES
PR INTERVAL: 146 MS
QRS AXIS: 72 DEGREES
QRSD INTERVAL: 100 MS
QT INTERVAL: 344 MS
QTC INTERVAL: 418 MS
T WAVE AXIS: 41 DEGREES
VENTRICULAR RATE: 89 BPM

## 2022-06-22 PROCEDURE — 93010 ELECTROCARDIOGRAM REPORT: CPT | Performed by: INTERNAL MEDICINE

## 2022-06-22 NOTE — UTILIZATION REVIEW
Notification of Discharge   This is a Notification of Discharge from our facility [address]  Please be advised that this patient has been discharge from our facility  Below you will find the admission and discharge date and time including the patients disposition  UTILIZATION REVIEW CONTACT:  Luzma Hernandez  Utilization   Network Utilization Review Department  Phone: [phone] x carefully listen to the prompts  All voicemails are confidential   Email: [email or web address]     PHYSICIAN ADVISORY SERVICES:  FOR CNKI-LJ-YOEC REVIEW - MEDICAL NECESSITY DENIAL  Phone: [phone]  Fax: [phone]  Email: [email or web address]     PRESENTATION DATE: 6/19/2022 11:04 AM  OBERVATION ADMISSION DATE:   INPATIENT ADMISSION DATE: 6/19/22  3:12 PM   DISCHARGE DATE: 6/21/2022  4:35 PM  DISPOSITION: Home/Self Care Home/Self Care      IMPORTANT INFORMATION:  Send all requests for admission clinical reviews, approved or denied determinations and any other requests to dedicated fax number below belonging to the campus where the patient is receiving treatment   List of dedicated fax numbers:  [address] DENIALS (Administrative/Medical Necessity) [phone]   [address] (Maternity/NICU/Pediatrics) [phone]   Newcomb Bhandari [phone]   [address] [phone]   [address] [place] [address] [phone]   [address] [phone]   [facility]  [phone]   [address] [place] [address], [address]  [address] [facility] [address] [facility] [phone]

## 2022-06-24 LAB
BACTERIA BLD CULT: NORMAL
BACTERIA BLD CULT: NORMAL

## 2022-10-03 ENCOUNTER — APPOINTMENT (OUTPATIENT)
Dept: URGENT CARE | Facility: CLINIC | Age: 32
End: 2022-10-03
Payer: OTHER MISCELLANEOUS

## 2022-10-03 PROCEDURE — G0382 LEV 3 HOSP TYPE B ED VISIT: HCPCS | Performed by: PREVENTIVE MEDICINE

## 2022-10-03 PROCEDURE — 99283 EMERGENCY DEPT VISIT LOW MDM: CPT | Performed by: PREVENTIVE MEDICINE

## 2022-10-11 PROBLEM — A41.9 SEVERE SEPSIS (HCC): Status: RESOLVED | Noted: 2022-06-19 | Resolved: 2022-10-11

## 2022-10-11 PROBLEM — R65.20 SEVERE SEPSIS (HCC): Status: RESOLVED | Noted: 2022-06-19 | Resolved: 2022-10-11

## 2022-10-26 ENCOUNTER — OFFICE VISIT (OUTPATIENT)
Dept: INTERNAL MEDICINE CLINIC | Facility: CLINIC | Age: 32
End: 2022-10-26
Payer: COMMERCIAL

## 2022-10-26 VITALS
HEART RATE: 96 BPM | OXYGEN SATURATION: 97 % | HEIGHT: 71 IN | SYSTOLIC BLOOD PRESSURE: 122 MMHG | BODY MASS INDEX: 35.39 KG/M2 | WEIGHT: 252.8 LBS | DIASTOLIC BLOOD PRESSURE: 68 MMHG | RESPIRATION RATE: 16 BRPM

## 2022-10-26 DIAGNOSIS — Z11.4 SCREENING FOR HIV WITHOUT PRESENCE OF RISK FACTORS: ICD-10-CM

## 2022-10-26 DIAGNOSIS — Z00.00 ENCOUNTER FOR MEDICAL EXAMINATION TO ESTABLISH CARE: Primary | ICD-10-CM

## 2022-10-26 DIAGNOSIS — F17.200 VAPING NICOTINE DEPENDENCE, NON-TOBACCO PRODUCT: ICD-10-CM

## 2022-10-26 DIAGNOSIS — Z86.19 HISTORY OF SEPSIS: ICD-10-CM

## 2022-10-26 DIAGNOSIS — G51.0 BELL'S PALSY: ICD-10-CM

## 2022-10-26 DIAGNOSIS — Z72.0 TOBACCO CHEW USE: ICD-10-CM

## 2022-10-26 DIAGNOSIS — G93.5 CHIARI MALFORMATION TYPE I (HCC): ICD-10-CM

## 2022-10-26 DIAGNOSIS — Z11.59 ENCOUNTER FOR HEPATITIS C SCREENING TEST FOR LOW RISK PATIENT: ICD-10-CM

## 2022-10-26 DIAGNOSIS — E78.00 ELEVATED LDL CHOLESTEROL LEVEL: ICD-10-CM

## 2022-10-26 DIAGNOSIS — Z87.438 HISTORY OF PROSTATITIS: ICD-10-CM

## 2022-10-26 PROBLEM — N41.9 PROSTATITIS: Status: RESOLVED | Noted: 2022-06-19 | Resolved: 2022-10-26

## 2022-10-26 PROCEDURE — 99204 OFFICE O/P NEW MOD 45 MIN: CPT | Performed by: INTERNAL MEDICINE

## 2022-10-26 RX ORDER — CYCLOBENZAPRINE HCL 10 MG
TABLET ORAL
COMMUNITY
Start: 2022-09-30 | End: 2022-10-26 | Stop reason: ALTCHOICE

## 2022-10-26 RX ORDER — NAPROXEN 500 MG/1
TABLET ORAL
COMMUNITY
Start: 2022-09-30 | End: 2022-10-26 | Stop reason: ALTCHOICE

## 2022-10-26 NOTE — PROGRESS NOTES
Assessment/Plan:     Chasidy Cross is here to establish care  He is a  and works locally  He has a PMH for [place] palsy diagnosed about a year ago and episode of sepsis 2/2 prostatitis; he denies any other medical history that he knows of; chart review indicates he had a chiari malformation seen on imaging; will refer him to neurology and neuro surgery; unclear if steroids will help at this point; He chews tobacco and vapes; no significant family history; will obtain set of labs and follow up in 2 months  Quality Measures:     BMI Counseling: Body mass index is 35 26 kg/m²  The BMI is above normal  Nutrition recommendations include decreasing portion sizes and encouraging healthy choices of fruits and vegetables  Exercise recommendations include moderate physical activity 150 minutes/week  Rationale for BMI follow-up plan is due to patient being overweight or obese  Depression Screening and Follow-up Plan: Patient was screened for depression during today's encounter  They screened negative with a PHQ-2 score of 0  Tobacco Cessation Counseling: Tobacco cessation counseling was provided  The patient is sincerely urged to quit consumption of tobacco  He is not ready to quit tobacco           Return in about 2 months (around 12/26/2022)  No problem-specific Assessment & Plan notes found for this encounter  Diagnoses and all orders for this visit:    Encounter for medical examination to establish care    Bell's palsy  -     Lyme Antibody Profile with reflex to WB; Future  -     Ambulatory Referral to Neurology; Future    Chiari malformation type I ([facility] [address] )  -     CBC and differential; Future  -     Comprehensive metabolic panel; Future  -     TSH, 3rd generation with Free T4 reflex; Future  -     Ambulatory Referral to Neurosurgery; Future  -     Ambulatory Referral to Neurology; Future    Encounter for hepatitis C screening test for low risk patient  -     Hepatitis C antibody;  Future    Screening for HIV without presence of risk factors  -     HIV 1/2 Antigen/Antibody (4th Generation) w Reflex [facility]; Future    History of sepsis    History of prostatitis  -     UA w Reflex to Microscopic w Reflex to Culture -Lab Collect; Future    Elevated LDL cholesterol level  -     Lipid Panel with Direct LDL reflex; Future  -     Hemoglobin A1C; Future    Tobacco chew use    Vaping nicotine dependence, non-tobacco product    Other orders  -     Discontinue: cyclobenzaprine (FLEXERIL) 10 mg tablet; TAKE 1 TABLET BY MOUTH EVERY DAY NIGHTLY AS NEEDED FOR MUSCLE SPASMS (Patient not taking: Reported on 10/26/2022)  -     Discontinue: naproxen (NAPROSYN) 500 mg tablet; PLEASE SEE ATTACHED FOR DETAILED DIRECTIONS (Patient not taking: Reported on 10/26/2022)          Subjective:      Patient ID: Inocencio Galdamez is a 29 y o  male  Here to establish care  ALLERGIES:  Allergies   Allergen Reactions   • Pollen Extract Other (See Comments)       CURRENT MEDICATIONS:    Current Outpatient Medications:   •  albuterol (VENTOLIN HFA) 90 mcg/act inhaler, Inhale 1-2 puffs every 6 (six) hours as needed for wheezing, Disp: 18 g, Rfl: 0    ACTIVE PROBLEM LIST:  Patient Active Problem List   Diagnosis   • Tobacco chew use       PAST MEDICAL HISTORY:  Past Medical History:   Diagnosis Date   • Asthma    • Bell's palsy    • Hyperlipidemia     Childhood       PAST SURGICAL HISTORY:  History reviewed  No pertinent surgical history  FAMILY HISTORY:  History reviewed  No pertinent family history  SOCIAL HISTORY:  Social History     Socioeconomic History   • Marital status: Single     Spouse name: Not on file   • Number of children: Not on file   • Years of education: Not on file   • Highest education level: Not on file   Occupational History   • Not on file   Tobacco Use   • Smoking status: Current Every Day Smoker     Types: E-Cigarettes   • Smokeless tobacco: Current User     Types: Chew   Substance and Sexual Activity   • Alcohol use:  No Comment: 0   • Drug use: No   • Sexual activity: Not on file   Other Topics Concern   • Not on file   Social History Narrative   • Not on file     Social Determinants of Health     Financial Resource Strain: Not on file   Food Insecurity: Not on file   Transportation Needs: Not on file   Physical Activity: Not on file   Stress: Not on file   Social Connections: Not on file   Intimate Partner Violence: Not on file   Housing Stability: Not on file       Review of Systems   Musculoskeletal: Positive for back pain  Neurological: Positive for facial asymmetry and numbness  All other systems reviewed and are negative  Objective:  Vitals:    10/26/22 1618   BP: 122/68   BP Location: Right arm   Patient Position: Sitting   Cuff Size: Large   Pulse: 96   Resp: 16   SpO2: 97%   Weight: 115 kg (252 lb 12 8 oz)   Height: 5' 11" (1 803 m)     Body mass index is 35 26 kg/m²  Physical Exam  Vitals and nursing note reviewed  Constitutional:       Appearance: Normal appearance  HENT:      Head: Normocephalic and atraumatic  Cardiovascular:      Rate and Rhythm: Normal rate and regular rhythm  Heart sounds: Normal heart sounds  Pulmonary:      Effort: Pulmonary effort is normal       Breath sounds: Normal breath sounds  Abdominal:      General: Bowel sounds are normal       Palpations: Abdomen is soft  Musculoskeletal:         General: Normal range of motion  Cervical back: Normal range of motion  Right lower leg: No edema  Left lower leg: No edema  Lymphadenopathy:      Cervical: No cervical adenopathy  Skin:     General: Skin is warm and dry  Neurological:      General: No focal deficit present  Mental Status: He is alert and oriented to person, place, and time  Mental status is at baseline  Cranial Nerves: Cranial nerve deficit, dysarthria and facial asymmetry present  Sensory: Sensory deficit present  Motor: Motor function is intact  No weakness  Coordination: Coordination is intact  Gait: Gait normal    Psychiatric:         Mood and Affect: Mood normal          Behavior: Behavior normal            RESULTS:    No results found for this or any previous visit (from the past 1008 hour(s))  This note was created with voice recognition software  Phonic, grammatical and spelling errors may be present within the note as a result

## 2022-11-01 ENCOUNTER — APPOINTMENT (OUTPATIENT)
Dept: LAB | Facility: CLINIC | Age: 32
End: 2022-11-01

## 2022-11-01 DIAGNOSIS — G51.0 BELL'S PALSY: ICD-10-CM

## 2022-11-01 DIAGNOSIS — Z11.59 ENCOUNTER FOR HEPATITIS C SCREENING TEST FOR LOW RISK PATIENT: ICD-10-CM

## 2022-11-01 DIAGNOSIS — Z87.438 HISTORY OF PROSTATITIS: ICD-10-CM

## 2022-11-01 DIAGNOSIS — Z11.4 SCREENING FOR HIV WITHOUT PRESENCE OF RISK FACTORS: ICD-10-CM

## 2022-11-01 DIAGNOSIS — E78.00 ELEVATED LDL CHOLESTEROL LEVEL: ICD-10-CM

## 2022-11-01 DIAGNOSIS — G93.5 CHIARI MALFORMATION TYPE I (HCC): ICD-10-CM

## 2022-11-01 LAB
BASOPHILS # BLD AUTO: 0.05 THOUSANDS/ÂΜL (ref 0–0.1)
BASOPHILS NFR BLD AUTO: 1 % (ref 0–1)
EOSINOPHIL # BLD AUTO: 0.02 THOUSAND/ÂΜL (ref 0–0.61)
EOSINOPHIL NFR BLD AUTO: 0 % (ref 0–6)
ERYTHROCYTE [DISTWIDTH] IN BLOOD BY AUTOMATED COUNT: 13.2 % (ref 11.6–15.1)
HCT VFR BLD AUTO: 46.5 % (ref 36.5–49.3)
HGB BLD-MCNC: 14.9 G/DL (ref 12–17)
IMM GRANULOCYTES # BLD AUTO: 0.02 THOUSAND/UL (ref 0–0.2)
IMM GRANULOCYTES NFR BLD AUTO: 0 % (ref 0–2)
LYMPHOCYTES # BLD AUTO: 1.94 THOUSANDS/ÂΜL (ref 0.6–4.47)
LYMPHOCYTES NFR BLD AUTO: 23 % (ref 14–44)
MCH RBC QN AUTO: 28.4 PG (ref 26.8–34.3)
MCHC RBC AUTO-ENTMCNC: 32 G/DL (ref 31.4–37.4)
MCV RBC AUTO: 89 FL (ref 82–98)
MONOCYTES # BLD AUTO: 0.68 THOUSAND/ÂΜL (ref 0.17–1.22)
MONOCYTES NFR BLD AUTO: 8 % (ref 4–12)
NEUTROPHILS # BLD AUTO: 5.69 THOUSANDS/ÂΜL (ref 1.85–7.62)
NEUTS SEG NFR BLD AUTO: 68 % (ref 43–75)
NRBC BLD AUTO-RTO: 0 /100 WBCS
PLATELET # BLD AUTO: 257 THOUSANDS/UL (ref 149–390)
PMV BLD AUTO: 11.8 FL (ref 8.9–12.7)
RBC # BLD AUTO: 5.24 MILLION/UL (ref 3.88–5.62)
WBC # BLD AUTO: 8.4 THOUSAND/UL (ref 4.31–10.16)

## 2022-11-02 LAB
ALBUMIN SERPL BCP-MCNC: 4.6 G/DL (ref 3.5–5)
ALP SERPL-CCNC: 54 U/L (ref 46–116)
ALT SERPL W P-5'-P-CCNC: 63 U/L (ref 12–78)
ANION GAP SERPL CALCULATED.3IONS-SCNC: 4 MMOL/L (ref 4–13)
AST SERPL W P-5'-P-CCNC: 34 U/L (ref 5–45)
B BURGDOR IGG+IGM SER-ACNC: 0.2 AI
BILIRUB SERPL-MCNC: 1.61 MG/DL (ref 0.2–1)
BILIRUB UR QL STRIP: NEGATIVE
BUN SERPL-MCNC: 11 MG/DL (ref 5–25)
CALCIUM SERPL-MCNC: 9.6 MG/DL (ref 8.3–10.1)
CHLORIDE SERPL-SCNC: 103 MMOL/L (ref 96–108)
CHOLEST SERPL-MCNC: 252 MG/DL
CLARITY UR: CLEAR
CO2 SERPL-SCNC: 28 MMOL/L (ref 21–32)
COLOR UR: COLORLESS
CREAT SERPL-MCNC: 0.98 MG/DL (ref 0.6–1.3)
EST. AVERAGE GLUCOSE BLD GHB EST-MCNC: 120 MG/DL
GFR SERPL CREATININE-BSD FRML MDRD: 103 ML/MIN/1.73SQ M
GLUCOSE P FAST SERPL-MCNC: 92 MG/DL (ref 65–99)
GLUCOSE UR STRIP-MCNC: NEGATIVE MG/DL
HBA1C MFR BLD: 5.8 %
HCV AB SER QL: NORMAL
HDLC SERPL-MCNC: 63 MG/DL
HGB UR QL STRIP.AUTO: NEGATIVE
KETONES UR STRIP-MCNC: ABNORMAL MG/DL
LDLC SERPL CALC-MCNC: 177 MG/DL (ref 0–100)
LEUKOCYTE ESTERASE UR QL STRIP: NEGATIVE
NITRITE UR QL STRIP: NEGATIVE
PH UR STRIP.AUTO: 6.5 [PH]
POTASSIUM SERPL-SCNC: 3.8 MMOL/L (ref 3.5–5.3)
PROT SERPL-MCNC: 8.4 G/DL (ref 6.4–8.4)
PROT UR STRIP-MCNC: NEGATIVE MG/DL
SODIUM SERPL-SCNC: 135 MMOL/L (ref 135–147)
SP GR UR STRIP.AUTO: 1 (ref 1–1.03)
TRIGL SERPL-MCNC: 58 MG/DL
TSH SERPL DL<=0.05 MIU/L-ACNC: 0.83 UIU/ML (ref 0.45–4.5)
UROBILINOGEN UR STRIP-ACNC: <2 MG/DL

## 2022-11-03 LAB — HIV 1+2 AB+HIV1 P24 AG SERPL QL IA: NORMAL

## 2022-11-17 ENCOUNTER — CONSULT (OUTPATIENT)
Dept: NEUROSURGERY | Facility: CLINIC | Age: 32
End: 2022-11-17

## 2022-11-17 VITALS
DIASTOLIC BLOOD PRESSURE: 75 MMHG | BODY MASS INDEX: 33.6 KG/M2 | RESPIRATION RATE: 16 BRPM | TEMPERATURE: 98.1 F | SYSTOLIC BLOOD PRESSURE: 116 MMHG | HEIGHT: 71 IN | WEIGHT: 240 LBS | HEART RATE: 73 BPM

## 2022-11-17 DIAGNOSIS — G93.5 CHIARI MALFORMATION TYPE I (HCC): ICD-10-CM

## 2022-11-17 RX ORDER — DIPHENOXYLATE HYDROCHLORIDE AND ATROPINE SULFATE 2.5; .025 MG/1; MG/1
1 TABLET ORAL DAILY
COMMUNITY

## 2022-11-17 RX ORDER — FLUTICASONE PROPIONATE 50 MCG
1 SPRAY, SUSPENSION (ML) NASAL AS NEEDED
COMMUNITY

## 2022-11-17 NOTE — PROGRESS NOTES
Office Note - Neurosurgery   Clyde Karimi 29 y o  male MRN: [id number]      Assessment:  Patient is 29years old young gentleman referred by his PCP for evaluation after his brain MRI ordered for workup of left facial palsy about a year ago  It took patient  so long to come to Neurosurgery office because he did not have PCP for many months  Patient reports feeling better with his left facial palsy started closing his eyes and also eating and swallowing, but has some residual left facial paresthesia and the asymmetry of his face  Has migraine headache denies any seizures, without headache associated with sneezing, coughing, straining, bending, or lifting heavy objects  No blurry vision or diplopia  Denies speech or swallowing issues  Exam-A&OX3  PERRL, EOMI 2 mm conj bilat  SF  Slight facial asymmetry noted on the left  Finger-to-nose test normal intervals of drift bilaterally  Strength is 5/5 and sensation to light touch intact throughout  DTR 2+ without clonus bilaterally and gait stable on his toe walking  MRI of brain done on 04/12/2021 demonstrates about 6-7 mm cerebellar tonsillar descent below foramen magnum compatible with Chiari 1 malformation, otherwise no hydrocephalus, and no dilation of 4th ventricle  Hx, PEx, and images reviewed with the patient  Mx plan discussed  I would recommend MRI of cervical and thoracic spine with and without contrast to survey for spinal cord syrinx  Order placed  Patient is following up with his PCP  Follow-up after image results  All Questions and concerns were answered to patient's satisfaction  Patient expressed his understandings and agreed with the plan  Plan:  1  MRI of thoracic spine with and  without contrast  2  MRI of cervical spine with and without contrast  3  BUN and creatinine  4  Follow-up after image results  5  And the on-call for image review and the results  6   Call with questions or concerns      Subjective/Objective     C/C: " Referred for evaluation of abnormal brain MRI"        HPI  Patient is 29years old young gentleman with significant past medical history, referred by his PCP for evaluation after his brain MRI ordered for workup of left facial palsy about a year ago  It took patient  so long to come to Neurosurgery office because he did not have PCP for many months  Patient reports feeling better with his left facial palsy started closing his eyes and also eating and swallowing, but has some residual left facial paresthesia and the asymmetry of his face  Has migraine headache denies any seizures, without headache associated with sneezing, coughing, straining, bending, or lifting heavy objects  No blurry vision or diplopia  Denies speech or swallowing issues  Denies any fever, chills, rigors, cough or chest pain  He denies history of hypertension, diabetes mellitus, congestive heart failure, stroke coma seizures, bleeding disorder or taking anticoagulant medications  Denies history of smoking cigarettes or drinking alcohol  Image findings as described in the assessment section above  ROS  Review of systems personally reviewed and updated  Review of Systems   Constitutional: Negative  HENT: Negative  Eyes:        Wears glasses and does have left eye dryness as well as left eye twitches   Respiratory: Negative  Cardiovascular: Negative  Gastrointestinal: Negative  Genitourinary:        Since recent UTI feels like doesn't empty bladder    Does drink a lot of water   Musculoskeletal: Negative  Feels like weird sensation/painful in joints entire body   Skin: Negative  Neurological: Positive for facial asymmetry (left side of face) and headaches (every now and then, seems to be getting headaches or migraines at least 1 a month)  Negative for dizziness, tremors, seizures, syncope, speech difficulty, weakness and numbness          Left side of face such as forehead, cheek, lip feels sore at touch Hematological: Negative  Psychiatric/Behavioral: Positive for decreased concentration (chronic, hard to focus)  Negative for sleep disturbance  Family History    Family History   Problem Relation Age of Onset   • Hypertension Mother    • Stroke Mother    • Migraines Mother    • COPD Father    • Asthma Father    • Migraines Brother    • Migraines Brother    • No Known Problems Brother        Social History    Social History     Socioeconomic History   • Marital status: Single     Spouse name: Not on file   • Number of children: Not on file   • Years of education: Not on file   • Highest education level: Not on file   Occupational History   • Not on file   Tobacco Use   • Smoking status: Some Days     Types: E-Cigarettes   • Smokeless tobacco: Former     Types: Chew   • Tobacco comments:     Currently Nicotine patches   Vaping Use   • Vaping Use: Some days   • Substances: Nicotine, Flavoring   Substance and Sexual Activity   • Alcohol use: No     Comment: 0   • Drug use: No   • Sexual activity: Not on file   Other Topics Concern   • Not on file   Social History Narrative   • Not on file     Social Determinants of Health     Financial Resource Strain: Not on file   Food Insecurity: Not on file   Transportation Needs: Not on file   Physical Activity: Not on file   Stress: Not on file   Social Connections: Not on file   Intimate Partner Violence: Not on file   Housing Stability: Not on file       Past Medical History    Past Medical History:   Diagnosis Date   • Asthma    • Bell's palsy    • Hyperlipidemia     Childhood       Surgical History    History reviewed  No pertinent surgical history      Medications      Current Outpatient Medications:   •  albuterol (VENTOLIN HFA) 90 mcg/act inhaler, Inhale 1-2 puffs every 6 (six) hours as needed for wheezing, Disp: 18 g, Rfl: 0  •  Cetirizine HCl (ZYRTEC ALLERGY PO), Take by mouth if needed, Disp: , Rfl:   •  fluticasone (FLONASE) 50 mcg/act nasal spray, 1 spray into each nostril if needed for rhinitis, Disp: , Rfl:   •  multivitamin (THERAGRAN) TABS, Take 1 tablet by mouth daily, Disp: , Rfl:     Allergies    Allergies   Allergen Reactions   • Pollen Extract Other (See Comments)       The following portions of the patient's history were reviewed and updated as appropriate: allergies, current medications, past family history, past medical history, past social history, past surgical history and problem list     Investigations    I personally reviewed the MRI results with the patient:        Physical Exam    Vitals:    11/17/22 1426   BP: 116/75   Pulse: 73   Resp: 16   Temp: 98 1 °F (36 7 °C)       Physical Exam  Constitutional:       Appearance: Normal appearance  HENT:      Head: Normocephalic and atraumatic  Eyes:      Extraocular Movements: Extraocular movements intact  Cardiovascular:      Pulses: Normal pulses  Pulmonary:      Effort: Pulmonary effort is normal    Musculoskeletal:         General: Normal range of motion  Cervical back: Normal range of motion  Neurological:      General: No focal deficit present  Mental Status: He is alert and oriented to person, place, and time  GCS: GCS eye subscore is 4  GCS verbal subscore is 5  GCS motor subscore is 6  Cranial Nerves: Cranial nerves 2-12 are intact  Sensory: Sensation is intact  Motor: Motor function is intact  Coordination: Finger-Nose-Finger Test normal       Deep Tendon Reflexes: Reflexes are normal and symmetric  Reflex Scores:       Tricep reflexes are 2+ on the right side and 2+ on the left side  Bicep reflexes are 2+ on the right side and 2+ on the left side  Brachioradialis reflexes are 2+ on the right side and 2+ on the left side  Patellar reflexes are 2+ on the right side and 2+ on the left side  Achilles reflexes are 2+ on the right side and 2+ on the left side    Psychiatric:         Speech: Speech normal        Neurologic Exam Mental Status   Oriented to person, place, and time  Speech: speech is normal   Level of consciousness: alert    Cranial Nerves   Cranial nerves II through XII intact  CN III, IV, VI   Right pupil: Size: 2 mm  Shape: regular  Reactivity: brisk  Left pupil: Size: 2 mm  Reactivity: brisk     Nystagmus: none     CN V   Left facial sensation deficit: complete    CN VII   Left facial weakness: peripheral    CN XI   CN XI normal      Motor Exam   Muscle bulk: normal  Overall muscle tone: normal  Right arm tone: normal  Left arm tone: normal  Right arm pronator drift: absent  Left arm pronator drift: absent  Right leg tone: normal  Left leg tone: normal    Sensory Exam   Light touch normal      Gait, Coordination, and Reflexes     Coordination   Finger to nose coordination: normal    Reflexes   Right brachioradialis: 2+  Left brachioradialis: 2+  Right biceps: 2+  Left biceps: 2+  Right triceps: 2+  Left triceps: 2+  Right patellar: 2+  Left patellar: 2+  Right achilles: 2+  Left achilles: 2+  Right : 2+  Left : 2+  Right Lomax: absent  Left Lomax: absent  Right ankle clonus: absent  Left pendular knee jerk: absent

## 2022-12-20 ENCOUNTER — HOSPITAL ENCOUNTER (OUTPATIENT)
Dept: MRI IMAGING | Facility: HOSPITAL | Age: 32
Discharge: HOME | End: 2022-12-20

## 2022-12-20 DIAGNOSIS — G93.5 CHIARI MALFORMATION TYPE I (HCC): ICD-10-CM

## 2022-12-20 RX ADMIN — GADOBUTROL 10 ML: 604.72 INJECTION INTRAVENOUS at 20:23

## 2022-12-21 ENCOUNTER — RA CDI HCC (OUTPATIENT)
Dept: OTHER | Facility: HOSPITAL | Age: 32
End: 2022-12-21

## 2022-12-21 NOTE — PROGRESS NOTES
[facility]  coding opportunities       Chart reviewed, no opportunity found: CHART REVIEWED, NO OPPORTUNITY FOUND        Patients Insurance        Commercial Insurance: [address] [place] [address]

## 2022-12-28 ENCOUNTER — OFFICE VISIT (OUTPATIENT)
Dept: INTERNAL MEDICINE CLINIC | Facility: CLINIC | Age: 32
End: 2022-12-28

## 2022-12-28 VITALS
HEIGHT: 71 IN | HEART RATE: 96 BPM | SYSTOLIC BLOOD PRESSURE: 110 MMHG | BODY MASS INDEX: 33.52 KG/M2 | WEIGHT: 239.4 LBS | RESPIRATION RATE: 16 BRPM | OXYGEN SATURATION: 94 % | DIASTOLIC BLOOD PRESSURE: 74 MMHG | TEMPERATURE: 100.4 F

## 2022-12-28 DIAGNOSIS — J20.8 ACUTE BRONCHITIS DUE TO OTHER SPECIFIED ORGANISMS: ICD-10-CM

## 2022-12-28 DIAGNOSIS — R50.9 FEVER, UNSPECIFIED FEVER CAUSE: ICD-10-CM

## 2022-12-28 DIAGNOSIS — E78.2 MIXED HYPERLIPIDEMIA: ICD-10-CM

## 2022-12-28 DIAGNOSIS — G93.5 CHIARI MALFORMATION TYPE I (HCC): Primary | ICD-10-CM

## 2022-12-28 DIAGNOSIS — J45.901 MILD ASTHMA WITH ACUTE EXACERBATION, UNSPECIFIED WHETHER PERSISTENT: ICD-10-CM

## 2022-12-28 DIAGNOSIS — R73.03 PREDIABETES: ICD-10-CM

## 2022-12-28 RX ORDER — AZITHROMYCIN 250 MG/1
TABLET, FILM COATED ORAL
Qty: 6 TABLET | Refills: 0 | Status: SHIPPED | OUTPATIENT
Start: 2022-12-28 | End: 2023-01-01

## 2022-12-28 RX ORDER — METHYLPREDNISOLONE 4 MG/1
TABLET ORAL
Qty: 21 EACH | Refills: 0 | Status: SHIPPED | OUTPATIENT
Start: 2022-12-28

## 2022-12-28 RX ORDER — ALBUTEROL SULFATE 90 UG/1
1-2 AEROSOL, METERED RESPIRATORY (INHALATION) EVERY 6 HOURS PRN
Qty: 18 G | Refills: 0 | Status: SHIPPED | OUTPATIENT
Start: 2022-12-28

## 2022-12-28 NOTE — PROGRESS NOTES
Assessment/Plan:     Emily Kim is here for follow up; he reports he is not feeling well and having SOB; he appears ill and is febrile; will tx for asthma exacerbation and test for COVID and flu  Discussed labs; needs to come back in 2 weeks for follow up visit  He has an abnormal lipid panel and a1c  Quality Measures:     BMI Counseling: Body mass index is 33 47 kg/m²  The BMI is above normal  Nutrition recommendations include decreasing portion sizes and encouraging healthy choices of fruits and vegetables  Exercise recommendations include moderate physical activity 150 minutes/week  Rationale for BMI follow-up plan is due to patient being overweight or obese  Depression Screening and Follow-up Plan: Clincally patient does not have depression  No treatment is required  Tobacco Cessation Counseling: Tobacco cessation counseling was provided  The patient is sincerely urged to quit consumption of tobacco  He is not ready to quit tobacco           Return in about 2 weeks (around 1/11/2023)  No problem-specific Assessment & Plan notes found for this encounter  Diagnoses and all orders for this visit:    Chiari malformation type I ([facility] )    Prediabetes  -     Hemoglobin A1C; Future  -     Lipid Panel with Direct LDL reflex; Future    Mixed hyperlipidemia    Fever, unspecified fever cause  -     Covid/Flu- Office Collect    Mild asthma with acute exacerbation, unspecified whether persistent  -     methylPREDNISolone 4 MG tablet therapy pack; Use as directed on package  -     albuterol (Ventolin HFA) 90 mcg/act inhaler; Inhale 1-2 puffs every 6 (six) hours as needed for wheezing    Acute bronchitis due to other specified organisms  -     albuterol (Ventolin HFA) 90 mcg/act inhaler; Inhale 1-2 puffs every 6 (six) hours as needed for wheezing  -     azithromycin (ZITHROMAX) 250 mg tablet; Take 2 tablets today then 1 tablet daily x 4 days          Subjective:      Patient ID: Casandra Bamberger is a 29 y o  male     Here for follow up  ALLERGIES:  Allergies   Allergen Reactions   • Pollen Extract Other (See Comments)       CURRENT MEDICATIONS:    Current Outpatient Medications:   •  albuterol (Ventolin HFA) 90 mcg/act inhaler, Inhale 1-2 puffs every 6 (six) hours as needed for wheezing, Disp: 18 g, Rfl: 0  •  azithromycin (ZITHROMAX) 250 mg tablet, Take 2 tablets today then 1 tablet daily x 4 days, Disp: 6 tablet, Rfl: 0  •  Cetirizine HCl (ZYRTEC ALLERGY PO), Take by mouth if needed, Disp: , Rfl:   •  fluticasone (FLONASE) 50 mcg/act nasal spray, 1 spray into each nostril if needed for rhinitis, Disp: , Rfl:   •  methylPREDNISolone 4 MG tablet therapy pack, Use as directed on package, Disp: 21 each, Rfl: 0  •  multivitamin (THERAGRAN) TABS, Take 1 tablet by mouth daily, Disp: , Rfl:     ACTIVE PROBLEM LIST:  Patient Active Problem List   Diagnosis   • Tobacco chew use       PAST MEDICAL HISTORY:  Past Medical History:   Diagnosis Date   • Asthma    • Bell's palsy    • Hyperlipidemia     Childhood       PAST SURGICAL HISTORY:  History reviewed  No pertinent surgical history      FAMILY HISTORY:  Family History   Problem Relation Age of Onset   • Hypertension Mother    • Stroke Mother    • Migraines Mother    • COPD Father    • Asthma Father    • Migraines Brother    • Migraines Brother    • No Known Problems Brother        SOCIAL HISTORY:  Social History     Socioeconomic History   • Marital status: Single     Spouse name: Not on file   • Number of children: Not on file   • Years of education: Not on file   • Highest education level: Not on file   Occupational History   • Not on file   Tobacco Use   • Smoking status: Some Days     Types: E-Cigarettes   • Smokeless tobacco: Former     Types: Chew   • Tobacco comments:     Currently Nicotine patches   Vaping Use   • Vaping Use: Some days   • Substances: Nicotine, Flavoring   Substance and Sexual Activity   • Alcohol use: No     Comment: 0   • Drug use: No   • Sexual activity: Not on file   Other Topics Concern   • Not on file   Social History Narrative   • Not on file     Social Determinants of Health     Financial Resource Strain: Not on file   Food Insecurity: Not on file   Transportation Needs: Not on file   Physical Activity: Not on file   Stress: Not on file   Social Connections: Not on file   Intimate Partner Violence: Not on file   Housing Stability: Not on file       Review of Systems   Constitutional: Positive for chills and fever  All other systems reviewed and are negative  Objective:  Vitals:    12/28/22 1558   BP: 110/74   BP Location: Left arm   Patient Position: Sitting   Cuff Size: Large   Pulse: 96   Resp: 16   Temp: 100 4 °F (38 °C)   TempSrc: Tympanic   SpO2: 94%   Weight: 109 kg (239 lb 6 4 oz)   Height: 5' 11" (1 803 m)     Body mass index is 33 39 kg/m²  Physical Exam  Vitals and nursing note reviewed  Constitutional:       Appearance: Normal appearance  He is ill-appearing  HENT:      Head: Normocephalic and atraumatic  Cardiovascular:      Rate and Rhythm: Normal rate  Pulmonary:      Effort: Pulmonary effort is normal    Musculoskeletal:         General: Normal range of motion  Cervical back: Normal range of motion  Skin:     General: Skin is warm  Neurological:      General: No focal deficit present  Mental Status: He is alert and oriented to person, place, and time  Mental status is at baseline  Psychiatric:         Mood and Affect: Mood normal            RESULTS:    No results found for this or any previous visit (from the past 1008 hour(s))  This note was created with voice recognition software  Phonic, grammatical and spelling errors may be present within the note as a result

## 2022-12-29 DIAGNOSIS — U07.1 COVID: Primary | ICD-10-CM

## 2022-12-29 LAB
FLUAV RNA RESP QL NAA+PROBE: NEGATIVE
FLUBV RNA RESP QL NAA+PROBE: NEGATIVE
SARS-COV-2 RNA RESP QL NAA+PROBE: POSITIVE

## 2022-12-29 RX ORDER — NIRMATRELVIR AND RITONAVIR 300-100 MG
3 KIT ORAL 2 TIMES DAILY
Qty: 30 TABLET | Refills: 0 | Status: SHIPPED | OUTPATIENT
Start: 2022-12-29 | End: 2023-01-03

## 2022-12-30 ENCOUNTER — TELEMEDICINE (OUTPATIENT)
Dept: NEUROSURGERY | Facility: CLINIC | Age: 32
End: 2022-12-30

## 2022-12-30 DIAGNOSIS — G93.5 CHIARI I MALFORMATION (HCC): Primary | ICD-10-CM

## 2022-12-30 NOTE — PROGRESS NOTES
Virtual Regular Visit    It was my intent to perform this visit via video technology but the patient was not able to do a video connection so the visit was completed via audio telephone only  Verification of patient location    Patient is located in the following state in which I hold an active license [place]      Assessment:    Patient is a 29 yrs old gentleman with Chiri 1 malformation found incidentally during work ups for left facial palsy  Patient denies any neurological symptoms related to Chiari malformation  MRI Cx and MRI thoracic spine was ordered to survey for syrinx  Patient is here to go over the results of his images  Images unremarkable for sign of SC cavitation/syrinx  Exam-Limited telephone virtual visit    Hx and images result discussed with the patient  Mx plan also discussed  I don't think  patient's facial palsy is related to Chiari I malformation  The lesion an incidental findings and patient with out any neurological symptoms/signs  No headache with bending, coughing, lifting objects, sneezing, or straining  No gait issues  I would recommend follow up on PRN basis  All questions and concerns were answered to patient's satisfaction  Patient verbalized his understandings and agreed with the plan          Plan:  1/ MRI  Of Cx and Thoracic some DJD without syrinx   2/ Patient doesn't have clinical symptoms associated with Chiari I malformation,no procedure or regular follow up imaging's required  3/ Advised to call office or go to ER with new neurological symptoms, otherwise F/U PRN   4/ Call with questions or cocnerns    Problem List Items Addressed This Visit    None           Reason for visit is   Chief Complaint   Patient presents with   • Virtual Regular Visit        Encounter provider Brianna Caldwell PA-C    Provider located at [address] [place] [id number]      Recent Visits  Date Type Provider Dept 12/28/22 Office Visit Timothy Abdul MD [facility]   Showing recent visits within past 7 days and meeting all other requirements  Future Appointments  No visits were found meeting these conditions  Showing future appointments within next 150 days and meeting all other requirements       The patient was identified by name and date of birth  Lincoln Dunaway was informed that this is a telemedicine visit and that the visit is being conducted through Telephone  My office door was closed  No one else was in the room  He acknowledged consent and understanding of privacy and security of the video platform  The patient has agreed to participate and understands they can discontinue the visit at any time  Patient is aware this is a billable service  Subjective    C/C;  Lincoln Dunaway is a 29 y o  male here to over his images results      HPI    All patient's medical Hx were reviewed and updated as follows: Allergies, current medication lists, past medical Hx, Past surgical Hx, family Hx, social Hx, and current medical lists  Patient is here for telephone virtual visit to go over his Cx and Thoracic MRI results  Images were ordered to survey for Syrinx in the setting of Chiari I malformation, 5 mm below line of foramen magnum  Patient reports no headache associated with coughing , bending, sneezing, straining or lifting heavy objects  Denies and radicular pain or gait instability  No B/B dysfunction  Denies any vision issues  No fever, chills, rigors, cough or chest pain  Past Medical History:   Diagnosis Date   • Asthma    • Bell's palsy    • Hyperlipidemia     Childhood       No past surgical history on file      Current Outpatient Medications   Medication Sig Dispense Refill   • albuterol (Ventolin HFA) 90 mcg/act inhaler Inhale 1-2 puffs every 6 (six) hours as needed for wheezing 18 g 0   • azithromycin (ZITHROMAX) 250 mg tablet Take 2 tablets today then 1 tablet daily x 4 days 6 tablet 0   • Cetirizine HCl (ZYRTEC ALLERGY PO) Take by mouth if needed     • fluticasone (FLONASE) 50 mcg/act nasal spray 1 spray into each nostril if needed for rhinitis     • methylPREDNISolone 4 MG tablet therapy pack Use as directed on package 21 each 0   • multivitamin (THERAGRAN) TABS Take 1 tablet by mouth daily     • nirmatrelvir & ritonavir (Paxlovid, 300/100,) tablet therapy pack Take 3 tablets by mouth 2 (two) times a day for 5 days Take 2 nirmatrelvir tablets + 1 ritonavir tablet together per dose 30 tablet 0     No current facility-administered medications for this visit  Allergies   Allergen Reactions   • Pollen Extract Other (See Comments)       Review of Systems   Constitutional: Negative  HENT: Negative  Eyes:        Wears glasses and does have left eye dryness as well as left eye twitches   Respiratory: Negative  Cardiovascular: Negative  Gastrointestinal: Negative  Genitourinary:        Since recent UTI feels like doesn't empty bladder    Does drink a lot of water   Musculoskeletal: Negative  Feels like weird sensation/painful in joints entire body   Skin: Negative  Neurological: Positive for facial asymmetry (SAME-left side of face) and headaches (SAME-every now and then, seems to be getting headaches or migraines at least 1 a month)  Negative for dizziness, tremors, seizures, syncope, speech difficulty, weakness and numbness  Left side of face such as forehead, cheek, lip feels sore at touch   Hematological: Negative  Psychiatric/Behavioral: Positive for decreased concentration (chronic, hard to focus)  Negative for sleep disturbance  All other systems reviewed and are negative  Video Exam    There were no vitals filed for this visit      Physical Exam     I spent 20 minutes with patient today in which greater than 50% of the time was spent in counseling/coordination of care regarding Mx plan

## 2023-01-05 ENCOUNTER — RA CDI HCC (OUTPATIENT)
Dept: OTHER | Facility: HOSPITAL | Age: 33
End: 2023-01-05

## 2023-01-05 NOTE — PROGRESS NOTES
[facility]  coding opportunities       Chart reviewed, no opportunity found: CHART REVIEWED, NO OPPORTUNITY FOUND      This is a reminder to address ALL HCC (risk adjustment) codes as found on active problem list for 2023 as patient scores reset to zero YOANDY      Patients Insurance        Commercial Insurance: [address] [place] [address]

## 2023-01-12 ENCOUNTER — OFFICE VISIT (OUTPATIENT)
Dept: INTERNAL MEDICINE CLINIC | Facility: CLINIC | Age: 33
End: 2023-01-12

## 2023-01-12 VITALS
DIASTOLIC BLOOD PRESSURE: 68 MMHG | HEART RATE: 84 BPM | BODY MASS INDEX: 32.51 KG/M2 | SYSTOLIC BLOOD PRESSURE: 104 MMHG | WEIGHT: 232.2 LBS | TEMPERATURE: 98 F | OXYGEN SATURATION: 96 % | HEIGHT: 71 IN | RESPIRATION RATE: 16 BRPM

## 2023-01-12 DIAGNOSIS — G93.5 CHIARI MALFORMATION TYPE I (HCC): ICD-10-CM

## 2023-01-12 DIAGNOSIS — R73.03 PREDIABETES: ICD-10-CM

## 2023-01-12 DIAGNOSIS — R82.4 KETONURIA: ICD-10-CM

## 2023-01-12 DIAGNOSIS — E78.2 MIXED HYPERLIPIDEMIA: Primary | ICD-10-CM

## 2023-01-12 NOTE — PROGRESS NOTES
Assessment/Plan:    Corinne Dawson is here for follow up; labs reviewed; Discussed lipids and a1c; he reports change in diet and he has already lost weight; No more f/u needed with NS; still needs to see neurology  Quality Measures:     BMI Counseling: There is no height or weight on file to calculate BMI  The BMI is above normal  Nutrition recommendations include decreasing portion sizes and encouraging healthy choices of fruits and vegetables  Exercise recommendations include moderate physical activity 150 minutes/week  Rationale for BMI follow-up plan is due to patient being overweight or obese  Depression Screening and Follow-up Plan: Clincally patient does not have depression  No treatment is required  Return in about 6 months (around 7/12/2023)  No problem-specific Assessment & Plan notes found for this encounter  Diagnoses and all orders for this visit:    Mixed hyperlipidemia  -     CBC and differential; Future  -     Comprehensive metabolic panel; Future  -     Lipid Panel with Direct LDL reflex; Future    Chiari malformation type I (H[facility])    Prediabetes  -     Hemoglobin A1C; Future    Ketonuria  -     Urinalysis, Screen          Subjective:      Patient ID: Uzair Terry is a 34 y o  male  Here for follow up        ALLERGIES:  Allergies   Allergen Reactions   • Pollen Extract Other (See Comments)       CURRENT MEDICATIONS:    Current Outpatient Medications:   •  albuterol (Ventolin HFA) 90 mcg/act inhaler, Inhale 1-2 puffs every 6 (six) hours as needed for wheezing, Disp: 18 g, Rfl: 0  •  Cetirizine HCl (ZYRTEC ALLERGY PO), Take by mouth if needed, Disp: , Rfl:   •  fluticasone (FLONASE) 50 mcg/act nasal spray, 1 spray into each nostril if needed for rhinitis, Disp: , Rfl:   •  multivitamin (THERAGRAN) TABS, Take 1 tablet by mouth daily, Disp: , Rfl:     ACTIVE PROBLEM LIST:  Patient Active Problem List   Diagnosis   • Tobacco chew use       PAST MEDICAL HISTORY:  Past Medical History:   Diagnosis Date   • Asthma    • Bell's palsy    • Hyperlipidemia     Childhood       PAST SURGICAL HISTORY:  History reviewed  No pertinent surgical history  FAMILY HISTORY:  Family History   Problem Relation Age of Onset   • Hypertension Mother    • Stroke Mother    • Migraines Mother    • COPD Father    • Asthma Father    • Migraines Brother    • Migraines Brother    • No Known Problems Brother        SOCIAL HISTORY:  Social History     Socioeconomic History   • Marital status: Single     Spouse name: Not on file   • Number of children: Not on file   • Years of education: Not on file   • Highest education level: Not on file   Occupational History   • Not on file   Tobacco Use   • Smoking status: Some Days     Types: E-Cigarettes   • Smokeless tobacco: Former     Types: Chew   • Tobacco comments:     Currently Nicotine patches   Vaping Use   • Vaping Use: Some days   • Substances: Nicotine, Flavoring   Substance and Sexual Activity   • Alcohol use: No     Comment: 0   • Drug use: No   • Sexual activity: Not on file   Other Topics Concern   • Not on file   Social History Narrative   • Not on file     Social Determinants of Health     Financial Resource Strain: Not on file   Food Insecurity: Not on file   Transportation Needs: Not on file   Physical Activity: Not on file   Stress: Not on file   Social Connections: Not on file   Intimate Partner Violence: Not on file   Housing Stability: Not on file       Review of Systems   All other systems reviewed and are negative  Objective:  Vitals:    01/12/23 1607   BP: 104/68   BP Location: Left arm   Patient Position: Sitting   Cuff Size: Large   Pulse: 84   Resp: 16   Temp: 98 °F (36 7 °C)   TempSrc: Tympanic   SpO2: 96%   Weight: 105 kg (232 lb 3 2 oz)   Height: 5' 11" (1 803 m)     Body mass index is 32 39 kg/m²  Physical Exam  Vitals and nursing note reviewed  Constitutional:       Appearance: Normal appearance     HENT:      Head: Normocephalic and atraumatic  Cardiovascular:      Rate and Rhythm: Normal rate  Pulmonary:      Effort: Pulmonary effort is normal    Musculoskeletal:         General: Normal range of motion  Cervical back: Normal range of motion  Skin:     General: Skin is warm  Neurological:      General: No focal deficit present  Mental Status: He is alert and oriented to person, place, and time  Mental status is at baseline  Psychiatric:         Mood and Affect: Mood normal            RESULTS:    Recent Results (from the past 1008 hour(s))   Covid/Flu- Office Collect    Collection Time: 12/28/22  4:07 PM    Specimen: Nose; Nares   Result Value Ref Range    SARS-CoV-2 Positive (A) Negative    INFLUENZA A PCR Negative Negative    INFLUENZA B PCR Negative Negative       This note was created with voice recognition software  Phonic, grammatical and spelling errors may be present within the note as a result

## 2023-01-19 DIAGNOSIS — J45.901 MILD ASTHMA WITH ACUTE EXACERBATION, UNSPECIFIED WHETHER PERSISTENT: ICD-10-CM

## 2023-01-19 DIAGNOSIS — J20.8 ACUTE BRONCHITIS DUE TO OTHER SPECIFIED ORGANISMS: ICD-10-CM

## 2023-01-19 RX ORDER — ALBUTEROL SULFATE 90 UG/1
1-2 AEROSOL, METERED RESPIRATORY (INHALATION) EVERY 6 HOURS PRN
Qty: 18 G | Refills: 0 | Status: SHIPPED | OUTPATIENT
Start: 2023-01-19

## 2023-07-06 ENCOUNTER — APPOINTMENT (OUTPATIENT)
Dept: LAB | Facility: CLINIC | Age: 33
End: 2023-07-06
Payer: COMMERCIAL

## 2023-07-06 ENCOUNTER — OFFICE VISIT (OUTPATIENT)
Dept: INTERNAL MEDICINE CLINIC | Facility: CLINIC | Age: 33
End: 2023-07-06
Payer: COMMERCIAL

## 2023-07-06 VITALS
HEART RATE: 82 BPM | OXYGEN SATURATION: 96 % | DIASTOLIC BLOOD PRESSURE: 70 MMHG | WEIGHT: 235.8 LBS | HEIGHT: 71 IN | BODY MASS INDEX: 33.01 KG/M2 | SYSTOLIC BLOOD PRESSURE: 122 MMHG

## 2023-07-06 DIAGNOSIS — Z72.0 TOBACCO CHEW USE: ICD-10-CM

## 2023-07-06 DIAGNOSIS — J45.901 MILD ASTHMA WITH ACUTE EXACERBATION, UNSPECIFIED WHETHER PERSISTENT: Primary | ICD-10-CM

## 2023-07-06 DIAGNOSIS — E78.2 MIXED HYPERLIPIDEMIA: ICD-10-CM

## 2023-07-06 DIAGNOSIS — R73.03 PREDIABETES: ICD-10-CM

## 2023-07-06 DIAGNOSIS — G51.0 BELL'S PALSY: ICD-10-CM

## 2023-07-06 DIAGNOSIS — B35.6 JOCK ITCH: ICD-10-CM

## 2023-07-06 DIAGNOSIS — G93.5 CHIARI MALFORMATION TYPE I (HCC): ICD-10-CM

## 2023-07-06 LAB
ALBUMIN SERPL BCP-MCNC: 3.9 G/DL (ref 3.5–5)
ALP SERPL-CCNC: 46 U/L (ref 46–116)
ALT SERPL W P-5'-P-CCNC: 49 U/L (ref 12–78)
ANION GAP SERPL CALCULATED.3IONS-SCNC: 3 MMOL/L
AST SERPL W P-5'-P-CCNC: 31 U/L (ref 5–45)
BACTERIA UR QL AUTO: ABNORMAL /HPF
BASOPHILS # BLD AUTO: 0.06 THOUSANDS/ÂΜL (ref 0–0.1)
BASOPHILS NFR BLD AUTO: 1 % (ref 0–1)
BILIRUB SERPL-MCNC: 1.12 MG/DL (ref 0.2–1)
BILIRUB UR QL STRIP: NEGATIVE
BUN SERPL-MCNC: 14 MG/DL (ref 5–25)
CALCIUM SERPL-MCNC: 9.3 MG/DL (ref 8.3–10.1)
CHLORIDE SERPL-SCNC: 112 MMOL/L (ref 96–108)
CHOLEST SERPL-MCNC: 204 MG/DL
CLARITY UR: CLEAR
CO2 SERPL-SCNC: 28 MMOL/L (ref 21–32)
COLOR UR: YELLOW
CREAT SERPL-MCNC: 0.96 MG/DL (ref 0.6–1.3)
EOSINOPHIL # BLD AUTO: 0.16 THOUSAND/ÂΜL (ref 0–0.61)
EOSINOPHIL NFR BLD AUTO: 3 % (ref 0–6)
ERYTHROCYTE [DISTWIDTH] IN BLOOD BY AUTOMATED COUNT: 13.6 % (ref 11.6–15.1)
GFR SERPL CREATININE-BSD FRML MDRD: 104 ML/MIN/1.73SQ M
GLUCOSE P FAST SERPL-MCNC: 95 MG/DL (ref 65–99)
GLUCOSE UR STRIP-MCNC: NEGATIVE MG/DL
HCT VFR BLD AUTO: 45.8 % (ref 36.5–49.3)
HDLC SERPL-MCNC: 50 MG/DL
HGB BLD-MCNC: 14.6 G/DL (ref 12–17)
HGB UR QL STRIP.AUTO: NEGATIVE
IMM GRANULOCYTES # BLD AUTO: 0.01 THOUSAND/UL (ref 0–0.2)
IMM GRANULOCYTES NFR BLD AUTO: 0 % (ref 0–2)
KETONES UR STRIP-MCNC: NEGATIVE MG/DL
LDLC SERPL CALC-MCNC: 136 MG/DL (ref 0–100)
LEUKOCYTE ESTERASE UR QL STRIP: NEGATIVE
LYMPHOCYTES # BLD AUTO: 2.04 THOUSANDS/ÂΜL (ref 0.6–4.47)
LYMPHOCYTES NFR BLD AUTO: 32 % (ref 14–44)
MCH RBC QN AUTO: 29.1 PG (ref 26.8–34.3)
MCHC RBC AUTO-ENTMCNC: 31.9 G/DL (ref 31.4–37.4)
MCV RBC AUTO: 91 FL (ref 82–98)
MONOCYTES # BLD AUTO: 0.64 THOUSAND/ÂΜL (ref 0.17–1.22)
MONOCYTES NFR BLD AUTO: 10 % (ref 4–12)
MUCOUS THREADS UR QL AUTO: ABNORMAL
NEUTROPHILS # BLD AUTO: 3.38 THOUSANDS/ÂΜL (ref 1.85–7.62)
NEUTS SEG NFR BLD AUTO: 54 % (ref 43–75)
NITRITE UR QL STRIP: NEGATIVE
NON-SQ EPI CELLS URNS QL MICRO: ABNORMAL /HPF
NRBC BLD AUTO-RTO: 0 /100 WBCS
PH UR STRIP.AUTO: 6 [PH]
PLATELET # BLD AUTO: 249 THOUSANDS/UL (ref 149–390)
PMV BLD AUTO: 11.7 FL (ref 8.9–12.7)
POTASSIUM SERPL-SCNC: 4.5 MMOL/L (ref 3.5–5.3)
PROT SERPL-MCNC: 7.5 G/DL (ref 6.4–8.4)
PROT UR STRIP-MCNC: ABNORMAL MG/DL
RBC # BLD AUTO: 5.02 MILLION/UL (ref 3.88–5.62)
RBC #/AREA URNS AUTO: ABNORMAL /HPF
SODIUM SERPL-SCNC: 143 MMOL/L (ref 135–147)
SP GR UR STRIP.AUTO: 1.03 (ref 1–1.03)
TRIGL SERPL-MCNC: 91 MG/DL
UROBILINOGEN UR STRIP-ACNC: <2 MG/DL
WBC # BLD AUTO: 6.29 THOUSAND/UL (ref 4.31–10.16)
WBC #/AREA URNS AUTO: ABNORMAL /HPF

## 2023-07-06 PROCEDURE — 80061 LIPID PANEL: CPT

## 2023-07-06 PROCEDURE — 81001 URINALYSIS AUTO W/SCOPE: CPT

## 2023-07-06 PROCEDURE — 83036 HEMOGLOBIN GLYCOSYLATED A1C: CPT

## 2023-07-06 PROCEDURE — 99213 OFFICE O/P EST LOW 20 MIN: CPT | Performed by: INTERNAL MEDICINE

## 2023-07-06 PROCEDURE — 80053 COMPREHEN METABOLIC PANEL: CPT

## 2023-07-06 PROCEDURE — 36415 COLL VENOUS BLD VENIPUNCTURE: CPT

## 2023-07-06 PROCEDURE — 85025 COMPLETE CBC W/AUTO DIFF WBC: CPT

## 2023-07-06 RX ORDER — NYSTATIN 100000 [USP'U]/G
POWDER TOPICAL 3 TIMES DAILY
Qty: 15 G | Refills: 3 | Status: SHIPPED | OUTPATIENT
Start: 2023-07-06 | End: 2023-07-13 | Stop reason: SDUPTHER

## 2023-07-06 NOTE — PROGRESS NOTES
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 F/u in 8 months and after labs. Quality Measures:     BMI Counseling: There is no height or weight on file to calculate BMI. The BMI is above normal. Nutrition recommendations include decreasing portion sizes and encouraging healthy choices of fruits and vegetables. Exercise recommendations include moderate physical activity 150 minutes/week. Rationale for BMI follow-up plan is due to patient being overweight or obese. Depression Screening and Follow-up Plan: Clincally patient does not have depression. No treatment is required. Tobacco Cessation Counseling: Tobacco cessation counseling was provided. The patient is sincerely urged to quit consumption of tobacco. He is not ready to quit tobacco.          Return in about 8 months (around 3/6/2024). No problem-specific Assessment & Plan notes found for this encounter. Diagnoses and all orders for this visit:    Mild asthma with acute exacerbation, unspecified whether persistent    Mixed hyperlipidemia    Bell's palsy    Prediabetes    Jock itch  -     nystatin (MYCOSTATIN) powder; Apply topically 3 (three) times a day    Tobacco chew use          Subjective:      Patient ID: Jerrica Whittington is a 34 y.o. male. Here for routine f/u.       ALLERGIES:  Allergies   Allergen Reactions   • Pollen Extract Other (See Comments)       CURRENT MEDICATIONS:    Current Outpatient Medications:   •  albuterol (Ventolin HFA) 90 mcg/act inhaler, Inhale 1-2 puffs every 6 (six) hours as needed for wheezing, Disp: 18 g, Rfl: 0  •  Cetirizine HCl (ZYRTEC ALLERGY PO), Take by mouth if needed, Disp: , Rfl:   •  fluticasone (FLONASE) 50 mcg/act nasal spray, 1 spray into each nostril if needed for rhinitis, Disp: , Rfl:   •  nystatin (MYCOSTATIN) powder, Apply topically 3 (three) times a day, Disp: 15 g, Rfl: 3    ACTIVE PROBLEM LIST:  Patient Active Problem List   Diagnosis   • Tobacco chew use       PAST MEDICAL HISTORY:  Past Medical History:   Diagnosis Date   • Asthma    • Bell's palsy    • Hyperlipidemia     Childhood       PAST SURGICAL HISTORY:  History reviewed. No pertinent surgical history. FAMILY HISTORY:  Family History   Problem Relation Age of Onset   • Hypertension Mother    • Stroke Mother    • Migraines Mother    • COPD Father    • Asthma Father    • Migraines Brother    • Migraines Brother    • No Known Problems Brother        SOCIAL HISTORY:  Social History     Socioeconomic History   • Marital status: Single     Spouse name: Not on file   • Number of children: Not on file   • Years of education: Not on file   • Highest education level: Not on file   Occupational History   • Not on file   Tobacco Use   • Smoking status: Some Days   • Smokeless tobacco: Former     Types: Chew   • Tobacco comments:     Currently Nicotine patches   Vaping Use   • Vaping Use: Some days   • Substances: Nicotine, Flavoring   Substance and Sexual Activity   • Alcohol use: No     Comment: 0   • Drug use: No   • Sexual activity: Not on file   Other Topics Concern   • Not on file   Social History Narrative   • Not on file     Social Determinants of Health     Financial Resource Strain: Not on file   Food Insecurity: Not on file   Transportation Needs: Not on file   Physical Activity: Not on file   Stress: Not on file   Social Connections: Not on file   Intimate Partner Violence: Not on file   Housing Stability: Not on file       Review of Systems   Constitutional: Negative for chills and fever. HENT: Negative for ear pain and sore throat. Eyes: Negative for pain and visual disturbance. Respiratory: Negative for cough and shortness of breath. Cardiovascular: Negative for chest pain and palpitations. Gastrointestinal: Negative for abdominal pain and vomiting.    Genitourinary: Negative for dysuria and hematuria. Musculoskeletal: Negative for arthralgias and back pain. Skin: Negative for color change and rash. Neurological: Negative for seizures and syncope. All other systems reviewed and are negative. Objective:  Vitals:    07/06/23 1553   BP: 122/70   BP Location: Left arm   Patient Position: Sitting   Cuff Size: Adult   Pulse: 82   SpO2: 96%   Weight: 107 kg (235 lb 12.8 oz)   Height: 5' 11" (1.803 m)     Body mass index is 32.89 kg/m². Physical Exam  Vitals and nursing note reviewed. Constitutional:       Appearance: Normal appearance. HENT:      Head: Normocephalic and atraumatic. Cardiovascular:      Rate and Rhythm: Normal rate. Pulmonary:      Effort: Pulmonary effort is normal.   Musculoskeletal:         General: Normal range of motion. Cervical back: Normal range of motion. Skin:     General: Skin is warm and dry. Neurological:      General: No focal deficit present. Mental Status: He is alert and oriented to person, place, and time. Mental status is at baseline. Psychiatric:         Mood and Affect: Mood normal.           RESULTS:    In chart    This note was created with voice recognition software. Phonic, grammatical and spelling errors may be present within the note as a result.

## 2023-07-09 LAB
EST. AVERAGE GLUCOSE BLD GHB EST-MCNC: 114 MG/DL
HBA1C MFR BLD: 5.6 %

## 2023-07-13 DIAGNOSIS — B35.6 JOCK ITCH: ICD-10-CM

## 2023-07-14 RX ORDER — NYSTATIN 100000 [USP'U]/G
POWDER TOPICAL 3 TIMES DAILY
Qty: 15 G | Refills: 0 | Status: SHIPPED | OUTPATIENT
Start: 2023-07-14

## 2023-10-10 ENCOUNTER — APPOINTMENT (OUTPATIENT)
Dept: LAB | Facility: HOSPITAL | Age: 33
End: 2023-10-10
Attending: INTERNAL MEDICINE
Payer: COMMERCIAL

## 2023-10-10 DIAGNOSIS — R39.15 URINARY URGENCY: ICD-10-CM

## 2023-10-10 DIAGNOSIS — R35.0 URINARY FREQUENCY: Primary | ICD-10-CM

## 2023-10-10 DIAGNOSIS — R35.0 URINARY FREQUENCY: ICD-10-CM

## 2023-10-10 LAB
BILIRUB UR QL STRIP: NEGATIVE
CLARITY UR: CLEAR
COLOR UR: COLORLESS
GLUCOSE UR STRIP-MCNC: NEGATIVE MG/DL
HGB UR QL STRIP.AUTO: NEGATIVE
KETONES UR STRIP-MCNC: NEGATIVE MG/DL
LEUKOCYTE ESTERASE UR QL STRIP: NEGATIVE
NITRITE UR QL STRIP: NEGATIVE
PH UR STRIP.AUTO: 6.5 [PH]
PROT UR STRIP-MCNC: NEGATIVE MG/DL
SP GR UR STRIP.AUTO: 1.01 (ref 1–1.03)
UROBILINOGEN UR STRIP-ACNC: <2 MG/DL

## 2023-10-10 PROCEDURE — 87491 CHLMYD TRACH DNA AMP PROBE: CPT

## 2023-10-10 PROCEDURE — 87591 N.GONORRHOEAE DNA AMP PROB: CPT

## 2023-10-10 PROCEDURE — 81003 URINALYSIS AUTO W/O SCOPE: CPT

## 2023-10-11 LAB
C TRACH DNA SPEC QL NAA+PROBE: NEGATIVE
N GONORRHOEA DNA SPEC QL NAA+PROBE: NEGATIVE

## 2024-03-04 DIAGNOSIS — E78.2 MIXED HYPERLIPIDEMIA: Primary | ICD-10-CM

## 2024-03-04 DIAGNOSIS — J45.901 MILD ASTHMA WITH ACUTE EXACERBATION, UNSPECIFIED WHETHER PERSISTENT: ICD-10-CM

## 2024-03-04 DIAGNOSIS — R73.03 PREDIABETES: ICD-10-CM

## 2024-03-27 ENCOUNTER — TELEPHONE (OUTPATIENT)
Dept: OTHER | Facility: OTHER | Age: 34
End: 2024-03-27

## 2024-03-28 NOTE — TELEPHONE ENCOUNTER
"Pt stated, \"I have a referral and would like to make an appointment asap.\"    Please call pt when office reopens.   "

## 2024-04-12 ENCOUNTER — TELEPHONE (OUTPATIENT)
Dept: NEUROLOGY | Facility: CLINIC | Age: 34
End: 2024-04-12

## 2024-04-12 NOTE — TELEPHONE ENCOUNTER
ADD ON     ADD ON - np self referral / G51.0 (ICD-10-CM) - Facial paralysis on left side / [employer]   ID# [id number]   Group#: [id number]

## 2024-04-26 ENCOUNTER — OFFICE VISIT (OUTPATIENT)
Dept: NEUROLOGY | Facility: CLINIC | Age: 34
End: 2024-04-26
Payer: COMMERCIAL

## 2024-04-26 VITALS
HEIGHT: 71 IN | SYSTOLIC BLOOD PRESSURE: 124 MMHG | WEIGHT: 244 LBS | TEMPERATURE: 97.7 F | HEART RATE: 88 BPM | DIASTOLIC BLOOD PRESSURE: 82 MMHG | BODY MASS INDEX: 34.16 KG/M2

## 2024-04-26 DIAGNOSIS — Q04.8 CEREBELLAR TONSILLAR ECTOPIA (HCC): ICD-10-CM

## 2024-04-26 DIAGNOSIS — R29.810 FACIAL WEAKNESS: ICD-10-CM

## 2024-04-26 DIAGNOSIS — G51.0 CRANIAL NERVE VII PALSY: Primary | ICD-10-CM

## 2024-04-26 PROCEDURE — 99204 OFFICE O/P NEW MOD 45 MIN: CPT | Performed by: PSYCHIATRY & NEUROLOGY

## 2024-04-26 NOTE — PROGRESS NOTES
Patient ID: Martínez Franco is a 34 y.o. male.    Assessment/Plan:           Problem List Items Addressed This Visit          Nervous and Auditory    Cerebellar tonsillar ectopia (HCC)    Cranial nerve VII palsy - Primary       Orthopedic/Musculoskeletal    Facial weakness        Mr. Franco has presented to [facility] multiple sclerosis center for evaluation.  Patient has remote history of peripheral cranial nerve VII paralysis several years ago with gradual recovery described.  Patient completed imaging of the brain in addition to CT angiogram at that time with no signs of ischemic or hemorrhagic changes appreciated, no signs of CNS demyelination.  Patient had severe case of facial weakness as it took him 9 months to finally start recovering; Lyme disease was negative.    Patient does have residual weakness and bringing his left eyebrow up, patient cannot completely close his left eye with some dryness still noted and patient is able to whistle but facial asymmetry mild, still seen.    Patient may proceed with physical therapy, no significant concern from my standpoint noted.  Patient does have some tenderness to touch in the area of left maxillary sinus-doubt this will be result of facial paralysis.    Patient described instance of 3 days of bilateral lower extremity paralysis with MRIs C/T spine done with no signs of intrinsic cord pathology appreciated at that time.  Patient had made full recovery with no residual weakness been noted.    Patient had comprehensive workup for infectious and rheumatological conditions with negative results been described.    Patient does have cerebellar tonsillar ectopia with no signs of syrinx, likely acquired; patient did not report any significant headaches which would bring concern for hypo or hypertensive headaches.  No visual dysfunction, no double vision.  Patient is to follow-up with ophthalmology next week.    Patient has no weakness in upper or lower extremity, no balance  issue.    Patient is to follow-up with [facility] neurology on as-needed basis.       Subjective:left facial paralysis    HPI  Mr. Franco is a 34-year-old male who presented to [facility] multiple sclerosis center for evaluation.  Patient has remote history of left facial paralysis with slow recovery reported at that time.  Patient stated 3 years ago she developed left-sided facial weakness with hearing problem and changes in her taste.  Patient describes his hearing as a painful.  Patient reported having slow recovery as it took him up to 9 months when he finally started having left eyebrow moving up.  Patient had long time when he started closing his left eye.  Patient states schedule dry eye.  Patient completed several imaging studies including CT angiogram with no intracranial pathology appreciated, stroke was ruled out.  Patient scheduled follow-up with ophthalmology for left eye dryness and keratoma.  Patient also reported developing difficulty ambulating with bilateral symmetry weakness were he completed imaging cervical thoracic spine in 2022 with no intrinsic or pathology advised at that time with no concern for transverse mellitus or multiple sclerosis brought at that time.    Today patient presented to review his case with no focal upper or lower extremity weakness been advised.  Patient has residual left-sided facial weakness that patient is comfortable to close his left eye without difficulties.      The following portions of the patient's history were reviewed and updated as appropriate: He  has a past medical history of Asthma, Bell's palsy, and Hyperlipidemia.  He   Patient Active Problem List    Diagnosis Date Noted    Cranial nerve VII palsy 04/28/2024    Cerebellar tonsillar ectopia (HCC) 04/26/2024    Facial weakness 04/26/2024    Tobacco chew use 06/19/2022     He  has no past surgical history on file.  His family history includes Asthma in his father; COPD in his father; Hypertension in his  "mother; Migraines in his brother, brother, and mother; No Known Problems in his brother; Stroke in his mother.  He  reports that he has been smoking. He has quit using smokeless tobacco.  His smokeless tobacco use included chew. He reports that he does not drink alcohol and does not use drugs.  Current Outpatient Medications   Medication Sig Dispense Refill    albuterol (Ventolin HFA) 90 mcg/act inhaler Inhale 1-2 puffs every 6 (six) hours as needed for wheezing 18 g 0    Cetirizine HCl (ZYRTEC ALLERGY PO) Take by mouth if needed      fluticasone (FLONASE) 50 mcg/act nasal spray 1 spray into each nostril if needed for rhinitis      nystatin (MYCOSTATIN) powder Apply topically 3 (three) times a day (Patient not taking: Reported on 4/26/2024) 15 g 0     No current facility-administered medications for this visit.     Current Outpatient Medications on File Prior to Visit   Medication Sig    albuterol (Ventolin HFA) 90 mcg/act inhaler Inhale 1-2 puffs every 6 (six) hours as needed for wheezing    Cetirizine HCl (ZYRTEC ALLERGY PO) Take by mouth if needed    fluticasone (FLONASE) 50 mcg/act nasal spray 1 spray into each nostril if needed for rhinitis    nystatin (MYCOSTATIN) powder Apply topically 3 (three) times a day (Patient not taking: Reported on 4/26/2024)     No current facility-administered medications on file prior to visit.     He is allergic to pollen extract..         Objective:    Blood pressure 124/82, pulse 88, temperature 97.7 °F (36.5 °C), height 5' 11\" (1.803 m), weight 111 kg (244 lb).    Physical Exam    Neurological Exam  CONSTITUTIONAL: NAD, pleasant. NECK: supple, no lymphadenopathy, no thyromegaly, no JVD. CARDIOVASCULAR: RRR, normal S1S2, no murmurs, no rubs. RESP: clear to auscultation bilaterally, no wheezes/rhonchi/rales. ABDOMEN: soft, non tender, non distended. SKIN: no rash or skin lesions. EXTREMITIES: no edema, pulses 2+bilaterally. PSYCH: appropriate mood and affect  NEUROLOGIC " COMPREHENSIVE EXAM: Patient is oriented to person, place and time, NAD; appropriate affect. CN II, III, IV, V, VI, VII,VIII,IX,X,XI-XII intact with EOMI, PERRLA, OKN intact, VF grossly intact, fundi poorly visualized secondary to pupillary constriction; asymmetric face noted, limited movement in left eyebrow.  Minimal asymmetry in facial smiling is mild decreased left nasolabial fold. Motor: 5/5 UE/LE bilateral symmetric; Sensory: intact to light touch and pinprick bilaterally; normal vibration sensation feet bilaterally; Coordination within normal limits on FTN and MIRANDA testing; DTR: 2/4 through, no Babinski, no clonus. Tandem gait is intact. Romberg: absent.    ROS:    Review of Systems   Constitutional:  Negative for appetite change, fatigue and fever.   HENT: Negative.  Negative for hearing loss, tinnitus, trouble swallowing and voice change.    Eyes: Negative.  Negative for photophobia, pain and visual disturbance.   Respiratory: Negative.  Negative for shortness of breath.    Cardiovascular: Negative.  Negative for palpitations.   Gastrointestinal: Negative.  Negative for nausea and vomiting.   Endocrine: Negative.  Negative for cold intolerance.   Genitourinary: Negative.  Negative for dysuria, frequency and urgency.   Musculoskeletal:  Negative for back pain, gait problem, myalgias, neck pain and neck stiffness.   Skin: Negative.  Negative for rash.   Allergic/Immunologic: Negative.    Neurological: Negative.  Negative for dizziness, tremors, seizures, syncope, facial asymmetry, speech difficulty, weakness, light-headedness, numbness and headaches.        Facial issues, bells palsy?  Stinging joint pains, feels like someone is stabbing in his joints does not last. Elbows & knees  Numbness in his hands/feet occasionally   Hematological: Negative.  Does not bruise/bleed easily.   Psychiatric/Behavioral: Negative.  Negative for confusion, hallucinations and sleep disturbance.    All other systems reviewed and  are negative.

## 2024-04-28 PROBLEM — G51.0 CRANIAL NERVE VII PALSY: Status: ACTIVE | Noted: 2024-04-28

## 2024-05-25 ENCOUNTER — HOSPITAL ENCOUNTER (EMERGENCY)
Facility: HOSPITAL | Age: 34
Discharge: HOME | End: 2024-05-25
Attending: STUDENT IN AN ORGANIZED HEALTH CARE EDUCATION/TRAINING PROGRAM
Payer: COMMERCIAL

## 2024-05-25 VITALS
TEMPERATURE: 98.8 F | RESPIRATION RATE: 18 BRPM | SYSTOLIC BLOOD PRESSURE: 128 MMHG | DIASTOLIC BLOOD PRESSURE: 82 MMHG | HEART RATE: 78 BPM | OXYGEN SATURATION: 97 %

## 2024-05-25 DIAGNOSIS — N40.0 BPH (BENIGN PROSTATIC HYPERPLASIA): Primary | ICD-10-CM

## 2024-05-25 LAB
ALBUMIN SERPL BCP-MCNC: 4.4 G/DL (ref 3.5–5)
ALP SERPL-CCNC: 46 U/L (ref 34–104)
ALT SERPL W P-5'-P-CCNC: 37 U/L (ref 7–52)
ANION GAP SERPL CALCULATED.3IONS-SCNC: 6 MMOL/L (ref 4–13)
AST SERPL W P-5'-P-CCNC: 25 U/L (ref 13–39)
BASOPHILS # BLD AUTO: 0.04 THOUSANDS/ÂΜL (ref 0–0.1)
BASOPHILS NFR BLD AUTO: 1 % (ref 0–1)
BILIRUB SERPL-MCNC: 1.26 MG/DL (ref 0.2–1)
BILIRUB UR QL STRIP: NEGATIVE
BUN SERPL-MCNC: 15 MG/DL (ref 5–25)
CALCIUM SERPL-MCNC: 9.4 MG/DL (ref 8.4–10.2)
CHLORIDE SERPL-SCNC: 103 MMOL/L (ref 96–108)
CLARITY UR: CLEAR
CO2 SERPL-SCNC: 28 MMOL/L (ref 21–32)
COLOR UR: NORMAL
CREAT SERPL-MCNC: 0.9 MG/DL (ref 0.6–1.3)
EOSINOPHIL # BLD AUTO: 0.1 THOUSAND/ÂΜL (ref 0–0.61)
EOSINOPHIL NFR BLD AUTO: 2 % (ref 0–6)
ERYTHROCYTE [DISTWIDTH] IN BLOOD BY AUTOMATED COUNT: 12.9 % (ref 11.6–15.1)
GFR SERPL CREATININE-BSD FRML MDRD: 112 ML/MIN/1.73SQ M
GLUCOSE SERPL-MCNC: 119 MG/DL (ref 65–140)
GLUCOSE UR STRIP-MCNC: NEGATIVE MG/DL
HCT VFR BLD AUTO: 45.3 % (ref 36.5–49.3)
HGB BLD-MCNC: 15.3 G/DL (ref 12–17)
HGB UR QL STRIP.AUTO: NEGATIVE
IMM GRANULOCYTES # BLD AUTO: 0.01 THOUSAND/UL (ref 0–0.2)
IMM GRANULOCYTES NFR BLD AUTO: 0 % (ref 0–2)
KETONES UR STRIP-MCNC: NEGATIVE MG/DL
LEUKOCYTE ESTERASE UR QL STRIP: NEGATIVE
LYMPHOCYTES # BLD AUTO: 1.44 THOUSANDS/ÂΜL (ref 0.6–4.47)
LYMPHOCYTES NFR BLD AUTO: 25 % (ref 14–44)
MCH RBC QN AUTO: 29.3 PG (ref 26.8–34.3)
MCHC RBC AUTO-ENTMCNC: 33.8 G/DL (ref 31.4–37.4)
MCV RBC AUTO: 87 FL (ref 82–98)
MONOCYTES # BLD AUTO: 0.52 THOUSAND/ÂΜL (ref 0.17–1.22)
MONOCYTES NFR BLD AUTO: 9 % (ref 4–12)
NEUTROPHILS # BLD AUTO: 3.56 THOUSANDS/ÂΜL (ref 1.85–7.62)
NEUTS SEG NFR BLD AUTO: 63 % (ref 43–75)
NITRITE UR QL STRIP: NEGATIVE
NRBC BLD AUTO-RTO: 0 /100 WBCS
PH UR STRIP.AUTO: 7 [PH]
PLATELET # BLD AUTO: 217 THOUSANDS/UL (ref 149–390)
PMV BLD AUTO: 10.5 FL (ref 8.9–12.7)
POTASSIUM SERPL-SCNC: 4.2 MMOL/L (ref 3.5–5.3)
PROT SERPL-MCNC: 7.6 G/DL (ref 6.4–8.4)
PROT UR STRIP-MCNC: NEGATIVE MG/DL
RBC # BLD AUTO: 5.22 MILLION/UL (ref 3.88–5.62)
SODIUM SERPL-SCNC: 137 MMOL/L (ref 135–147)
SP GR UR STRIP.AUTO: 1.02 (ref 1–1.03)
UROBILINOGEN UR STRIP-ACNC: <2 MG/DL
WBC # BLD AUTO: 5.67 THOUSAND/UL (ref 4.31–10.16)

## 2024-05-25 PROCEDURE — 87591 N.GONORRHOEAE DNA AMP PROB: CPT

## 2024-05-25 PROCEDURE — 99283 EMERGENCY DEPT VISIT LOW MDM: CPT

## 2024-05-25 PROCEDURE — 81003 URINALYSIS AUTO W/O SCOPE: CPT | Performed by: STUDENT IN AN ORGANIZED HEALTH CARE EDUCATION/TRAINING PROGRAM

## 2024-05-25 PROCEDURE — 36415 COLL VENOUS BLD VENIPUNCTURE: CPT

## 2024-05-25 PROCEDURE — 80053 COMPREHEN METABOLIC PANEL: CPT

## 2024-05-25 PROCEDURE — 85025 COMPLETE CBC W/AUTO DIFF WBC: CPT

## 2024-05-25 PROCEDURE — 87491 CHLMYD TRACH DNA AMP PROBE: CPT

## 2024-05-25 PROCEDURE — 99284 EMERGENCY DEPT VISIT MOD MDM: CPT

## 2024-05-25 RX ORDER — TAMSULOSIN HYDROCHLORIDE 0.4 MG/1
0.4 CAPSULE ORAL
Qty: 14 CAPSULE | Refills: 0 | Status: SHIPPED | OUTPATIENT
Start: 2024-05-25 | End: 2024-06-08

## 2024-05-25 NOTE — ED PROVIDER NOTES
"History  Chief Complaint   Patient presents with    Groin Pain     Hx of \"benign enlarged prostate\" with UTI, patient reports \"pain in my prostate\" this past Tuesday, with dribbling urination, burning with urination, and \"stinging\" pain in penis.      Patient is a 35-year-old male history of BPH and septic prostatitis presenting emergency department with UTI-like symptoms that started 11 days ago.  Patient reports pain with urination, bleeding then it is a burning sensation.  Patient also has associated urinary retention stating he goes to the bathroom frequently and finds himself standing of the toilet for a number of minutes before any urination \"dribbles\" out.  Patient also admits to a stinging sensation of the urethra stating that he does not after urinating to feel a stinging sensation in urethra.  Patient states that last week he also felt a cramp in his \"prostate\" which is never happened before.  Patient states he only follow-up urology once after his hospitalization in  for his septic prostatitis caused by E. coli.  When he was evaluated patient's urologist said that he just had BPH and that his prostate was fine.  Patient denies any fever, penile discharge, penile swelling, testicular pain, testicular swelling, or any rashes or lesions on the genitalia.        Prior to Admission Medications   Prescriptions Last Dose Informant Patient Reported? Taking?   Cetirizine HCl (ZYRTEC ALLERGY PO)  Self Yes No   Sig: Take by mouth if needed   albuterol (Ventolin HFA) 90 mcg/act inhaler   No No   Sig: Inhale 1-2 puffs every 6 (six) hours as needed for wheezing   fluticasone (FLONASE) 50 mcg/act nasal spray  Self Yes No   Si spray into each nostril if needed for rhinitis   nystatin (MYCOSTATIN) powder   No No   Sig: Apply topically 3 (three) times a day   Patient not taking: Reported on 2024      Facility-Administered Medications: None       Past Medical History:   Diagnosis Date    Asthma     Bell's palsy "     Hyperlipidemia     Childhood       History reviewed. No pertinent surgical history.    Family History   Problem Relation Age of Onset    Hypertension Mother     Stroke Mother     Migraines Mother     COPD Father     Asthma Father     Migraines Brother     Migraines Brother     No Known Problems Brother      I have reviewed and agree with the history as documented.    E-Cigarette/Vaping    E-Cigarette Use Current Some Day User      E-Cigarette/Vaping Substances    Nicotine Yes     THC No     CBD No     Flavoring Yes     Other No     Unknown No      Social History     Tobacco Use    Smoking status: Some Days    Smokeless tobacco: Former     Types: Chew    Tobacco comments:     Currently Nicotine patches   Vaping Use    Vaping status: Some Days    Substances: Nicotine, Flavoring   Substance Use Topics    Alcohol use: No     Comment: 0    Drug use: No       Review of Systems   Constitutional:  Negative for appetite change, chills, diaphoresis, fatigue and fever.   HENT:  Negative for ear discharge, ear pain, mouth sores, nosebleeds, postnasal drip, rhinorrhea, sinus pressure, sinus pain, sneezing and sore throat.    Eyes:  Negative for pain, discharge, redness and itching.   Respiratory:  Negative for cough, choking, chest tightness, shortness of breath, wheezing and stridor.    Cardiovascular:  Negative for chest pain, palpitations and leg swelling.   Gastrointestinal:  Negative for abdominal pain, constipation, diarrhea, nausea and vomiting.   Genitourinary:  Positive for decreased urine volume, difficulty urinating, dysuria, frequency, penile pain and urgency. Negative for flank pain, genital sores, hematuria, penile discharge, penile swelling, scrotal swelling and testicular pain.   Musculoskeletal:  Negative for arthralgias, back pain, myalgias and neck stiffness.   Skin:  Negative for color change, rash and wound.   Neurological:  Negative for dizziness, tremors, seizures, weakness, light-headedness, numbness  and headaches.   Psychiatric/Behavioral:  Negative for agitation and confusion.        Physical Exam  Physical Exam  Constitutional:       General: He is not in acute distress.     Appearance: Normal appearance. He is not ill-appearing.   HENT:      Head: Normocephalic and atraumatic.      Right Ear: Tympanic membrane, ear canal and external ear normal. There is no impacted cerumen.      Left Ear: Tympanic membrane, ear canal and external ear normal. There is no impacted cerumen.      Nose: No congestion or rhinorrhea.      Mouth/Throat:      Pharynx: No oropharyngeal exudate or posterior oropharyngeal erythema.   Eyes:      General:         Right eye: No discharge.         Left eye: No discharge.   Neck:      Vascular: No carotid bruit.   Cardiovascular:      Rate and Rhythm: Normal rate and regular rhythm.      Heart sounds: No murmur heard.     No friction rub. No gallop.   Pulmonary:      Effort: Pulmonary effort is normal. No respiratory distress.      Breath sounds: Normal breath sounds. No stridor. No wheezing, rhonchi or rales.   Chest:      Chest wall: No tenderness.   Abdominal:      General: Abdomen is flat. There is no distension.      Palpations: Abdomen is soft. There is no mass.      Tenderness: There is no abdominal tenderness. There is no right CVA tenderness, left CVA tenderness, guarding or rebound.      Hernia: No hernia is present.   Musculoskeletal:      Cervical back: No rigidity or tenderness.   Lymphadenopathy:      Cervical: No cervical adenopathy.   Skin:     General: Skin is warm and dry.      Capillary Refill: Capillary refill takes less than 2 seconds.      Coloration: Skin is not jaundiced or pale.      Findings: No bruising, erythema, lesion or rash.   Neurological:      Mental Status: He is alert.         Vital Signs  ED Triage Vitals [05/25/24 1023]   Temperature Pulse Respirations Blood Pressure SpO2   98.8 °F (37.1 °C) 78 18 128/82 97 %      Temp Source Heart Rate Source Patient  Position - Orthostatic VS BP Location FiO2 (%)   Oral Monitor Sitting Left arm --      Pain Score       --           Vitals:    05/25/24 1023   BP: 128/82   Pulse: 78   Patient Position - Orthostatic VS: Sitting         Visual Acuity      ED Medications  Medications - No data to display    Diagnostic Studies  Results Reviewed       Procedure Component Value Units Date/Time    Comprehensive metabolic panel [[id number]]  (Abnormal) Collected: 05/25/24 1125    Lab Status: Final result Specimen: Blood from Arm, Right Updated: 05/25/24 1145     Sodium 137 mmol/L      Potassium 4.2 mmol/L      Chloride 103 mmol/L      CO2 28 mmol/L      ANION GAP 6 mmol/L      BUN 15 mg/dL      Creatinine 0.90 mg/dL      Glucose 119 mg/dL      Calcium 9.4 mg/dL      AST 25 U/L      ALT 37 U/L      Alkaline Phosphatase 46 U/L      Total Protein 7.6 g/dL      Albumin 4.4 g/dL      Total Bilirubin 1.26 mg/dL      eGFR 112 ml/min/1.73sq m     Narrative:      National Kid[employer] Foundation guidelines for Chronic Kidney Disease (CKD):     Stage 1 with normal or high GFR (GFR > 90 mL/min/1.73 square meters)    Stage 2 Mild CKD (GFR = 60-89 mL/min/1.73 square meters)    Stage 3A Moderate CKD (GFR = 45-59 mL/min/1.73 square meters)    Stage 3B Moderate CKD (GFR = 30-44 mL/min/1.73 square meters)    Stage 4 Severe CKD (GFR = 15-29 mL/min/1.73 square meters)    Stage 5 End Stage CKD (GFR <15 mL/min/1.73 square meters)  Note: GFR calculation is accurate only with a steady state creatinine    CBC and differential [[id number]] Collected: 05/25/24 1125    Lab Status: Final result Specimen: Blood from Arm, Right Updated: 05/25/24 1130     WBC 5.67 Thousand/uL      RBC 5.22 Million/uL      Hemoglobin 15.3 g/dL      Hematocrit 45.3 %      MCV 87 fL      MCH 29.3 pg      MCHC 33.8 g/dL      RDW 12.9 %      MPV 10.5 fL      Platelets 217 Thousands/uL      nRBC 0 /100 WBCs      Segmented % 63 %      Immature Grans % 0 %      Lymphocytes % 25 %       Monocytes % 9 %      Eosinophils Relative 2 %      Basophils Relative 1 %      Absolute Neutrophils 3.56 Thousands/µL      Absolute Immature Grans 0.01 Thousand/uL      Absolute Lymphocytes 1.44 Thousands/µL      Absolute Monocytes 0.52 Thousand/µL      Eosinophils Absolute 0.10 Thousand/µL      Basophils Absolute 0.04 Thousands/µL     UA w Reflex to Microscopic w Reflex to Culture [[id number]] Collected: 05/25/24 1116    Lab Status: Final result Specimen: Urine, Clean Catch Updated: 05/25/24 1127     Color, UA Light Yellow     Clarity, UA Clear     Specific Gravity, UA 1.018     pH, UA 7.0     Leukocytes, UA Negative     Nitrite, UA Negative     Protein, UA Negative mg/dl      Glucose, UA Negative mg/dl      Ketones, UA Negative mg/dl      Urobilinogen, UA <2.0 mg/dl      Bilirubin, UA Negative     Occult Blood, UA Negative    Chlamydia/GC amplified DNA by PCR [[id number]] Collected: 05/25/24 1116    Lab Status: In process Specimen: Urine, Other Updated: 05/25/24 1120                   No orders to display              Procedures  Procedures         ED Course  ED Course as of 05/25/24 1239   Sat May 25, 2024   1229 Total Bilirubin(!): 1.26                               SBIRT 20yo+      Flowsheet Row Most Recent Value   Initial Alcohol Screen: US AUDIT-C     1. How often do you have a drink containing alcohol? 0 Filed at: 05/25/2024 1201   2. How many drinks containing alcohol do you have on a typical day you are drinking?  0 Filed at: 05/25/2024 1201   3a. Male UNDER 65: How often do you have five or more drinks on one occasion? 0 Filed at: 05/25/2024 1201   Audit-C Score 0 Filed at: 05/25/2024 1201   STEPHANIE: How many times in the past year have you...    Used an illegal drug or used a prescription medication for non-medical reasons? Never Filed at: 05/25/2024 1201                      Medical Decision Making  Patient is a 32-year-old male with past medical history of BPH and septic prostatitis in 2022 secondary to E.  "coli infection, scented to the emergency department with UTI-like symptoms. Patient states that he is having painful burning urination as well as urethral stinging when not urinating for the past 11 days.  Patient also has associated frequency with low urinary output stating that he is \"dribbling\".  Physical exam shows no inguinal lymphadenopathy, external genitalia normal without any rashes, lesions, or any inflammation of the penis, scrotum, or epididymis.  Urethral meatus shows no discharge.  Due to patient's extensive history with prostate issues patient underwent blood work with a CBC, CMP, as well as a urinalysis and ultrasound of the bladder.  CBC, CMP within normal limits besides elevated bilirubin but no other abnormal liver function tests.  Ultrasound the bladder showed 397 cc, but patient voided 400 cc directly after study.  Patient discharged home with referral to urology for further management of BPH symptoms.  Patient prescribed 2-week supply of tamsulosin 0.4 mg to be taking nightly for voiding related symptoms.  Discussed with patient that if fever, body aches, chills develop or if patient's urinary symptoms become severe to return to the emergency department.    Amount and/or Complexity of Data Reviewed  Labs: ordered.             Disposition  Final diagnoses:   BPH (benign prostatic hyperplasia)     Time reflects when diagnosis was documented in both MDM as applicable and the Disposition within this note       Time User Action Codes Description Comment    5/25/2024 12:23 PM Alessio Claros Add [N40.0] BPH (benign prostatic hyperplasia)           ED Disposition       ED Disposition   Discharge    Condition   Stable    Date/Time   Sat May 25, 2024 1222    Comment   Martínez Franco discharge to home/self care.                   Follow-up Information    None         Patient's Medications   Discharge Prescriptions    TAMSULOSIN (FLOMAX) 0.4 MG    Take 1 capsule (0.4 mg total) by mouth daily at bedtime for 14 " days       Start Date: 5/25/2024 End Date: 6/8/2024       Order Dose: 0.4 mg       Quantity: 14 capsule    Refills: 0           PDMP Review       None            ED Provider  Electronically Signed by             Alessio Claros PA-C  05/25/24 9778

## 2024-05-25 NOTE — DISCHARGE INSTRUCTIONS
Medication as prescribed  Follow-up with urology  Patient develops fever, body aches, chills, penile discharge, increased pain with urination return to the emergency department

## 2024-05-27 LAB
C TRACH DNA SPEC QL NAA+PROBE: NEGATIVE
N GONORRHOEA DNA SPEC QL NAA+PROBE: NEGATIVE

## 2024-07-31 NOTE — PROGRESS NOTES
8/1/2024      Chief Complaint   Patient presents with    Follow-up     Unable to urinate   Stinging  pain in his urethra, every couple of weeks like someone is pulling a string           Assessment and Plan    34 y.o. male -- New patient    1. History of prostatitis  2. BPH with LUTS   3. Urethral pain   4. Spraying with urination   - UA today unable to be obtained   - PVR today 72 mL  - TOVA today benign, enlarged prostate  - Discussed conservative measures with adequate hydration, avoidance of bladder irritants, avoidance of constipation  - Start Flomax  - restart saw palmetto   - Return for cysto possible TRUS. Rule out urethral stricture, bladder neck contracture, chronic prostatitis  - ER precautions given  - Call with any questions or concerns in the meantime  - All questions answered; patient understands and agrees with plan       History of Present Illness  Martínez Franco is a 34 y.o. male new patient here for evaluation of urinary symptoms.    Patient was seen in the emergency department in May 2024.  At this time he developed urinary symptoms including urethral pain, dysuria, weakened urinary stream.  He does have a history of septic prostatitis in 2022 and was admitted at this time.  States that his symptoms have been gradually worsening for many months.  He states that he has always had spraying with urination.  Denies prior  workup.  States that his symptoms are not consistent with prostatitis at this time.  Denies perineal pain, fever, chills, nausea, vomiting, dysuria, gross hematuria at this time.  Workup while in the hospital was negative for UTI or STD.  Denies any recent STD exposures.  Patient does have a history of gonorrhea and chlamydia 3 years ago.  Patient did have a kidney stone when he was 18, was able to pass this without surgical intervention.  Denies symptoms of kidney stone passage at this time.    Review of Systems   Constitutional:  Negative for activity change, appetite change, chills  "and fever.   HENT:  Negative for congestion and trouble swallowing.    Respiratory:  Negative for cough and shortness of breath.    Cardiovascular:  Negative for chest pain, palpitations and leg swelling.   Gastrointestinal:  Negative for abdominal pain, constipation, diarrhea, nausea and vomiting.   Genitourinary:  Positive for difficulty urinating. Negative for dysuria, flank pain, frequency, hematuria and urgency.   Musculoskeletal:  Negative for back pain and gait problem.   Skin:  Negative for wound.   Allergic/Immunologic: Negative for immunocompromised state.   Neurological:  Negative for dizziness and syncope.   Hematological:  Does not bruise/bleed easily.   Psychiatric/Behavioral:  Negative for confusion.    All other systems reviewed and are negative.      Vitals  Vitals:    08/01/24 0949   BP: 124/74   BP Location: Left arm   Patient Position: Sitting   Cuff Size: Large   Pulse: 75   Temp: 97.7 °F (36.5 °C)   TempSrc: Temporal   SpO2: 97%   Weight: 113 kg (249 lb)   Height: 5' 11\" (1.803 m)       Physical Exam  Constitutional:       General: He is not in acute distress.     Appearance: Normal appearance. He is not ill-appearing, toxic-appearing or diaphoretic.   HENT:      Head: Normocephalic.      Nose: No congestion.   Eyes:      General: No scleral icterus.        Right eye: No discharge.         Left eye: No discharge.      Conjunctiva/sclera: Conjunctivae normal.      Pupils: Pupils are equal, round, and reactive to light.   Pulmonary:      Effort: Pulmonary effort is normal.   Genitourinary:     Comments: Prostate benign enlargement.  No nodules or asymmetry or bogginess  Musculoskeletal:      Cervical back: Normal range of motion.   Skin:     General: Skin is warm and dry.      Coloration: Skin is not jaundiced or pale.      Findings: No bruising, erythema, lesion or rash.   Neurological:      General: No focal deficit present.      Mental Status: He is alert and oriented to person, place, and " time. Mental status is at baseline.      Gait: Gait normal.   Psychiatric:         Mood and Affect: Mood normal.         Behavior: Behavior normal.         Thought Content: Thought content normal.         Judgment: Judgment normal.           Past History  Past Medical History:   Diagnosis Date    Asthma     Bell's palsy     Hyperlipidemia     Childhood     Social History     Socioeconomic History    Marital status: Single     Spouse name: None    Number of children: None    Years of education: None    Highest education level: None   Occupational History    None   Tobacco Use    Smoking status: Some Days    Smokeless tobacco: Former     Types: Chew    Tobacco comments:     Currently Nicotine patches   Vaping Use    Vaping status: Some Days    Substances: Nicotine, Flavoring   Substance and Sexual Activity    Alcohol use: No     Comment: 0    Drug use: No    Sexual activity: None   Other Topics Concern    None   Social History Narrative    None     Social Determinants of Health     Financial Resource Strain: Not on file   Food Insecurity: Not on file   Transportation Needs: Not on file   Physical Activity: Not on file   Stress: Not on file   Social Connections: Unknown (6/18/2024)    Received from [employer]     How often do you feel lonely or isolated from those around you? (Adult - for ages 18 years and over): Not on file   Intimate Partner Violence: Not on file   Housing Stability: Not on file     Social History     Tobacco Use   Smoking Status Some Days   Smokeless Tobacco Former    Types: Chew   Tobacco Comments    Currently Nicotine patches     Family History   Problem Relation Age of Onset    Hypertension Mother     Stroke Mother     Migraines Mother     COPD Father     Asthma Father     Migraines Brother     Migraines Brother     No Known Problems Brother        The following portions of the patient's history were reviewed and updated as appropriate: allergies, current medications, past  "medical history, past social history, past surgical history and problem list.    Results  Recent Results (from the past 1 hour(s))   POCT Measure PVR    Collection Time: 08/01/24  9:51 AM   Result Value Ref Range    POST-VOID RESIDUAL VOLUME, ML POC 72 mL   ]  No results found for: \"PSA\"  Lab Results   Component Value Date    CALCIUM 9.4 05/25/2024    K 4.2 05/25/2024    CO2 28 05/25/2024     05/25/2024    BUN 15 05/25/2024    CREATININE 0.90 05/25/2024     Lab Results   Component Value Date    WBC 5.67 05/25/2024    HGB 15.3 05/25/2024    HCT 45.3 05/25/2024    MCV 87 05/25/2024     05/25/2024       Mehnaz Spence PA-C  " [employer]

## 2024-08-01 ENCOUNTER — OFFICE VISIT (OUTPATIENT)
Dept: UROLOGY | Facility: CLINIC | Age: 34
End: 2024-08-01
Payer: COMMERCIAL

## 2024-08-01 VITALS
SYSTOLIC BLOOD PRESSURE: 124 MMHG | BODY MASS INDEX: 34.86 KG/M2 | TEMPERATURE: 97.7 F | WEIGHT: 249 LBS | HEIGHT: 71 IN | HEART RATE: 75 BPM | DIASTOLIC BLOOD PRESSURE: 74 MMHG | OXYGEN SATURATION: 97 %

## 2024-08-01 DIAGNOSIS — N40.0 BENIGN PROSTATIC HYPERPLASIA, UNSPECIFIED WHETHER LOWER URINARY TRACT SYMPTOMS PRESENT: Primary | ICD-10-CM

## 2024-08-01 DIAGNOSIS — N40.0 BPH (BENIGN PROSTATIC HYPERPLASIA): ICD-10-CM

## 2024-08-01 LAB — POST-VOID RESIDUAL VOLUME, ML POC: 72 ML

## 2024-08-01 PROCEDURE — 99204 OFFICE O/P NEW MOD 45 MIN: CPT | Performed by: PHYSICIAN ASSISTANT

## 2024-08-01 PROCEDURE — 51798 US URINE CAPACITY MEASURE: CPT | Performed by: PHYSICIAN ASSISTANT

## 2024-08-01 RX ORDER — TAMSULOSIN HYDROCHLORIDE 0.4 MG/1
0.4 CAPSULE ORAL
Qty: 30 CAPSULE | Refills: 2 | Status: SHIPPED | OUTPATIENT
Start: 2024-08-01 | End: 2024-08-31

## 2024-08-07 ENCOUNTER — TELEPHONE (OUTPATIENT)
Dept: UROLOGY | Facility: CLINIC | Age: 34
End: 2024-08-07

## 2024-08-07 NOTE — TELEPHONE ENCOUNTER
Call placed to pt to make aware he is having cystoscopy done in office. Pt understands he will be awake for this procedure.

## 2024-08-10 PROBLEM — Z87.438 HISTORY OF CHRONIC PROSTATITIS: Status: ACTIVE | Noted: 2024-08-10

## 2024-08-10 PROBLEM — R30.0 DYSURIA: Status: ACTIVE | Noted: 2024-08-10

## 2024-08-10 PROBLEM — N48.89 PENILE PAIN: Status: ACTIVE | Noted: 2024-08-10

## 2024-08-10 PROBLEM — Z87.442 HISTORY OF RENAL CALCULI: Status: ACTIVE | Noted: 2024-08-10

## 2024-08-10 PROBLEM — R39.12 WEAK URINARY STREAM: Status: ACTIVE | Noted: 2024-08-10

## 2024-08-11 NOTE — PROGRESS NOTES
UROLOGY PROGRESS NOTE         NAME: Martínez Franco  AGE: 34 y.o. SEX: male  : 1990   MRN: [id number]    DATE: 2024  TIME: 2:08 PM    Assessment and Plan      Cystoscopy     Date/Time  2024 1:30 PM     Performed by  Evelio Jones MD   Authorized by  Evelio Jones MD         Procedure Details:  Procedure type: cystoscopy    Additional Procedure Details: Patient was placed supine and prepped and draped usual sterile fashion.  Patient had a normal anterior and posterior urethra he had a nonobstructing prostate.  He was slightly hyperemic.  The bladder was normal no tumors stones or diverticuli.  Patient tolerated procedure well.       Impression:   1. Dysuria  -     POCT urine dip auto non-scope  2. Weak urinary stream  -     Cystoscopy  3. Penile pain  4. History of chronic prostatitis  5. History of renal calculi  6. Pelvic floor dysfunction  -     Ambulatory Referral to Physical Therapy; Future       Plan: I suspect this patient's symptoms can be attributed to pelvic floor dysfunction.  Most likely secondary to chronic prostatitis.  I recommended patient to strongly consider physical therapy for pelvic floor dysfunction I think that will improve his flow, improve his erection quality, and likely improve his urethral pain.  I also recommended behavioral modifications with some of the discussion we had regarding sexual relations.    It certainly okay to continue saw palmetto but not likely to help his symptoms and if he wants to stop it I am okay, because he really needs physical therapy.    Told the patient if he does well with physical therapy follow-up with us as needed.  If needs to see us back we are happy to see him back      Chief Complaint     Chief Complaint   Patient presents with    Cystoscopy     Patient states he wakes up 1-2 x nightly, sometimes he has urge but not much void. Retrograde ejaculation. C/o start/stop in flow, dribbles and sprays. Hasn't started flomax yet.     Urinary  Urgency    Nocturia     History of Present Illness     HPI: Martínez Franco is a 34 y.o. year old male who presents with follow-up office visit on 8/1/2024 from physician assistant Mehnaz Spence.  I do not see that this note was cosigned by a physician.    Per her note patient with urethral pain and slow flow and history of prostatitis he was to start Flomax and restart saw palmetto and was to have a possible cystoscopy and ultrasound sizing of his prostate to rule out stricture.    Patient was seen in the ER in May 2024 with urethral pain dysuria weak stream.  He was negative for UTI or STDs.  Apparently had a history of gonorrhea and chlamydia 3 years ago.  Recent GC chlamydia 5/25/2024 was normal.  History of kidney stone when he was 18 years old.  TOVA on her exam revealed BPH but no nodules or bogginess.  CT scan in June 2022 showed enlarged prostate and a 2 mm nonobstructing right renal calculi.  There was also some bladder wall thickening back in 2022.    Patient states slowly eval urology recently had a cystoscopy by them said he had BPH.  Patient states he said the symptoms since he was a teenager.  Mainly its flow issues, hesitancy:*Stop of stream.  Urethral pain.  No pelvic pain no ED issues except when he has symptoms.  No orchialgia.  Does have stress ,discussed sexual relations behavior.        The following portions of the patient's history were reviewed and updated as appropriate: allergies, current medications, past family history, past medical history, past social history, past surgical history and problem list.  Past Medical History:   Diagnosis Date    Asthma     Bell's palsy     BPH (benign prostatic hypertrophy) 2023    Hyperlipidemia     Childhood    Kidney stone 2008    Prostatitis 2022    Urinary tract infection 2022     History reviewed. No pertinent surgical history.  shoulder  Review of Systems     Const: Denies chills, fever and weight loss.  CV: Denies chest pain.  Resp: Denies SOB.  GI:  "Denies abdominal pain, nausea and vomiting.  : Denies symptoms other than stated above.  Musculo: Denies back pain.    Objective   /60   Pulse 85   Temp 98.6 °F (37 °C)   Ht 5' 11\" (1.803 m)   Wt 110 kg (242 lb)   SpO2 98%   BMI 33.75 kg/m²     Physical Exam  Const: Appears healthy and well developed. No signs of acute distress present.  Resp: Respirations are regular and unlabored.   CV: Rate is regular. Rhythm is regular.  Abdomen: Abdomen is soft, nontender, and nondistended. Kidneys are not palpable.  : TOVA exam was smooth no masses or nodules he had a normal external genitalia exam as well.  Psych: Patient's attitude is cooperative. Mood is normal. Affect is normal.    Current Medications     Current Outpatient Medications:     albuterol (Ventolin HFA) 90 mcg/act inhaler, Inhale 1-2 puffs every 6 (six) hours as needed for wheezing, Disp: 18 g, Rfl: 0    Cetirizine HCl (ZYRTEC ALLERGY PO), Take by mouth if needed, Disp: , Rfl:     fluticasone (FLONASE) 50 mcg/act nasal spray, 1 spray into each nostril if needed for rhinitis, Disp: , Rfl:     tamsulosin (FLOMAX) 0.4 mg, Take 1 capsule (0.4 mg total) by mouth daily at bedtime, Disp: 30 capsule, Rfl: 2        Evelio Jones MD        "

## 2024-08-20 ENCOUNTER — PROCEDURE VISIT (OUTPATIENT)
Dept: UROLOGY | Facility: CLINIC | Age: 34
End: 2024-08-20
Payer: COMMERCIAL

## 2024-08-20 VITALS
OXYGEN SATURATION: 98 % | HEART RATE: 85 BPM | TEMPERATURE: 98.6 F | BODY MASS INDEX: 33.88 KG/M2 | DIASTOLIC BLOOD PRESSURE: 60 MMHG | WEIGHT: 242 LBS | SYSTOLIC BLOOD PRESSURE: 110 MMHG | HEIGHT: 71 IN

## 2024-08-20 DIAGNOSIS — N48.89 PENILE PAIN: ICD-10-CM

## 2024-08-20 DIAGNOSIS — Z87.442 HISTORY OF RENAL CALCULI: ICD-10-CM

## 2024-08-20 DIAGNOSIS — Z87.438 HISTORY OF CHRONIC PROSTATITIS: ICD-10-CM

## 2024-08-20 DIAGNOSIS — M62.89 PELVIC FLOOR DYSFUNCTION: ICD-10-CM

## 2024-08-20 DIAGNOSIS — R39.12 WEAK URINARY STREAM: ICD-10-CM

## 2024-08-20 DIAGNOSIS — R30.0 DYSURIA: Primary | ICD-10-CM

## 2024-08-20 LAB
SL AMB  POCT GLUCOSE, UA: NORMAL
SL AMB LEUKOCYTE ESTERASE,UA: NORMAL
SL AMB POCT BILIRUBIN,UA: NORMAL
SL AMB POCT BLOOD,UA: NORMAL
SL AMB POCT CLARITY,UA: CLEAR
SL AMB POCT COLOR,UA: YELLOW
SL AMB POCT KETONES,UA: NORMAL
SL AMB POCT NITRITE,UA: NORMAL
SL AMB POCT PH,UA: 7
SL AMB POCT SPECIFIC GRAVITY,UA: 1.02
SL AMB POCT URINE PROTEIN: NORMAL
SL AMB POCT UROBILINOGEN: 0.2

## 2024-08-20 PROCEDURE — 81003 URINALYSIS AUTO W/O SCOPE: CPT | Performed by: UROLOGY

## 2024-08-20 PROCEDURE — 52000 CYSTOURETHROSCOPY: CPT | Performed by: UROLOGY

## 2024-08-22 ENCOUNTER — TELEPHONE (OUTPATIENT)
Dept: UROLOGY | Facility: CLINIC | Age: 34
End: 2024-08-22

## 2024-08-22 NOTE — TELEPHONE ENCOUNTER
Call placed to pt. Referral for pelvic floor therapy placed for near pt home. Advised if he does here from anyone in week or 2 give our office a call. Pt verbalized understanding.

## 2024-08-30 ENCOUNTER — TELEPHONE (OUTPATIENT)
Dept: UROLOGY | Facility: CLINIC | Age: 34
End: 2024-08-30

## 2024-08-30 NOTE — TELEPHONE ENCOUNTER
Spoke with pt, gave him central scheduling # to call and connect him with the closest pelvic floor therapy near his address.  Nothing else needed at this time.

## 2024-09-22 NOTE — PROGRESS NOTES
PT Evaluation     Today's date: 2024  Patient name: Martínez Franco  : 1990  MRN: [id number]  Referring provider: Evelio Jones MD  Dx:   Encounter Diagnosis     ICD-10-CM    1. High-tone pelvic floor dysfunction  M62.89                      Assessment  Impairments: abnormal muscle tone, abnormal or restricted ROM, lacks appropriate home exercise program and pain with function  Symptom irritability: moderate    Assessment details: Martínez is a 34 year old male who presents with a primary concern of urethral pain and reduced urinary flow. Patietn presents with ROBERTO and limited global muscle mobility. Direct Pfm examination deferred until next treatment session.Time spent in patient education regarding toileting body mechanics, PFM anatomy, bladder health. Patient could benefit from a trial of PFPT to address presenting impairments and functional limitations and improve overall quality of life.   Understanding of Dx/Px/POC: good     Prognosis: good    Goals  STG 2 weeks  1. Instruction in an ongoing HEP  2. Direct Pfm examination    LTG 10 weeks  1. Decrease pain by >50% all times  2. Increased awareness of PFM tension  holding  3. Reduced pain with sexual activity  4. Improved urine flow  5. Positive voluntary relaxation PFM post activation    Plan  Patient would benefit from: skilled physical therapy  Planned modality interventions: low level laser therapy  Other planned modality interventions: WINBACK    Planned therapy interventions: joint mobilization, manual therapy, behavior modification, neuromuscular re-education, motor coordination training, patient/caregiver education, therapeutic activities, therapeutic exercise and home exercise program    Frequency: 1x week  Duration in weeks: 10  Plan of Care beginning date: 2024  Plan of Care expiration date: 2024  Treatment plan discussed with: patient        PT Pelvic Floor Subjective:   History of Present Illness:   Ongoing urinary issues since  "teen years including hesitancy and slow flow, spraying with urination and urethral pain.   History of STD 3 years ago. History of prostatitis for which he was hospitalized in . After that hospitalization, continued stinging in the urethra.   ED visit in May due to similar symptoms  Recent testing negative including cystoscopy.  Referred for out patient PFPT    Quality of life: good    Social Support:     Lives with:  Significant other    Relationship status: never     Work status: employed full time ([employer] )    Life stress severity: moderate (feels he holds tension in)  Diet and Exercise:      Exercise frequency: never    Not exercising due to: lack of time  Bladder Function:     Voiding Difficulties positive for: frequent urination (at times), hesitancy (at times) and incomplete emptying (at times)      Voiding Difficulties negative for: straining and paruresis       Voiding Difficulties comments:     Voiding frequency: every 1-2 hours and every 3-4 hours    Urinary leakage: no urine leakage    Urinary leakage not aggravated by: post-void dribble    Nocturia (episodes per night): 0, 2 and 1    Painful urination: No      Fluid Intake Type:  Water and coffee    Intake (ounces): Water: 80, Coffee: 16,     Intake (ounces) comment: Can drink up to a gallon water per day  Bowel Function:     Voiding DIfficulties: constipation      Bowel Function comments:  Describes bloating often    Bowel frequency: daily and every 2 days    Uses \"squatty potty\": Squatty Potty  Sexual Function:     Sexually Active:  Sexually activeSexual function: complete erection, able to maintain erection, able to achieve ejaculation, ejaculation pain, able to achieve orgasm and penile painno scrotal pain and no testicular painRetrograde ejaculation for the past several months to year:  Pain:     Current pain ratin    At best pain ratin    At worst pain ratin    Quality: stinging, sharp.    Aggravating factors:  " "[place], eating certain foods and unknown (drinking coffee)    Duration of symptoms:  Brief    Relieving factors:  Rest  Patient Goals:     Patient goals for therapy:  Decreased pain and improved quality of life    Other patient goals:  Improved flow with urination; sex without pain      Objective     Concurrent Complaints  Positive for bladder dysfunction. Negative for night pain, disturbed sleep, bowel dysfunction and saddle (S4) numbness    Neurological Testing     Sensation     Lumbar   Left   Intact: light touch  Paresthesia: light touch    Right   Intact: light touch  Paresthesia: light touch    Comments   Right light touch: describes paresthesia with prolonged sitting    Active Range of Motion     Lumbar   Flexion:  Restriction level: minimal  Extension:  with pain Restriction level: moderate  Left lateral flexion:  Restriction level: minimal  Right lateral flexion:  Restriction level: minimal    Strength/Myotome Testing     Additional Strength Details  No gross motor deficits    Tests     Left Hip   Negative LOBO and FADIR.     Right Hip   Negative LOBO and FADIR.     General Comments:      Hip Comments   Moderate hamstring, piriformis, and adductor tightness. R>L in limitation.  Strength gross 5/5      Abdominal Assessment:      Abdominal Assessment: Decreased soft tissue mobility lower abdominal quadrants  No TTP      Diastatis   Diastasis recti present: yes (with doming)  3\" above umbilicus (# fingers): 2.25  Umbilicus (# fingers): 1.75  3\" below umbilicus (# fingers): 0  no tenderness at linea alba     Visual Inspection of Perineum:     Pelvic Organ Prolapse   Comments: Direct PFM examination deferred until next visit due to time  Patient provided written and verbal consent        Pelvic Floor Muscle Exam:             pelvic floor exam consent given by patient    Pelvic exam completed: rectally                  POC expires Unit limit Auth Expiration date PT/OT + Visit Limit?   12/22 2024  na 60    "                        Visit/Unit Tracking  AUTH Status:  Date 9/23              No AUTH Used 1               Remaining                                      Precautions:       Manuals 9/23            Direct PFM                                                    Neuro Re-Ed                                                                                                        Ther Ex             LE stretch 10'  HEP            Diaphragmatic breathing             PFM relaxation                                                                              Ther Activity             Bladder health             PFM education NEAL galvez PP                         Gait Training                                       Modalities

## 2024-09-23 ENCOUNTER — EVALUATION (OUTPATIENT)
Dept: PHYSICAL THERAPY | Age: 34
End: 2024-09-23
Payer: COMMERCIAL

## 2024-09-23 DIAGNOSIS — M62.89 HIGH-TONE PELVIC FLOOR DYSFUNCTION: Primary | ICD-10-CM

## 2024-09-23 PROCEDURE — 97162 PT EVAL MOD COMPLEX 30 MIN: CPT | Performed by: PHYSICAL THERAPIST

## 2024-09-23 PROCEDURE — 97530 THERAPEUTIC ACTIVITIES: CPT | Performed by: PHYSICAL THERAPIST

## 2024-09-23 PROCEDURE — 97110 THERAPEUTIC EXERCISES: CPT | Performed by: PHYSICAL THERAPIST

## 2024-09-29 NOTE — PROGRESS NOTES
"Daily Note     Today's date: 2024  Patient name: Martínez Franco  : 1990  MRN: [id number]  Referring provider: Evelio Jones MD  Dx:   Encounter Diagnosis     ICD-10-CM    1. High-tone pelvic floor dysfunction  M62.89                      Subjective: Patient indicates having one morning where he was unable to initiate urine stream and then difficulty emptying. He attributes this to 'holding it' during the night. Other nights nocturia times 1-2 times. Notes one episode of urethral stinging pain since last visit. Random with no contributing factor. Notes left hip and thigh pain which he attributes to \"sleeping funny\".       Objective: See treatment diary below. Direct rectal PFM examination with written and verbal consent. Increased tone PFM, left greater than right. Greater PC/AL 2-3 and 9-10 oclock positions. 3/5 Bilateral with delayed relaxation. Negative involuntary relaxation.  No TTP, described as \"tight\".       Assessment: Tolerated treatment well. Patient would benefit from continued PT. HEP instruction this date. Verbalized and demonstrated understanding. Written handouts provided. Emphasis on PFM relaxation. General full body tension holding. Improved post session.       Plan: Continue per plan of care.      POC expires Unit limit Auth Expiration date PT/OT + Visit Limit?   2024  na 60                           Visit/Unit Tracking  AUTH Status:  Date              No AUTH Used 1 2              Remaining                                      Precautions:       Manuals            Direct PFM  PP                                                  Neuro Re-Ed                                                                                                        Ther Ex             LE stretch 10'  HEP 15'           Diaphragmatic breathing  10'  HEP           PFM relaxation  3'  HEP                                                                            Ther Activity           "   Bladder health             PFM education PP NEAL galvez PP                         Gait Training                                       Modalities

## 2024-09-30 ENCOUNTER — OFFICE VISIT (OUTPATIENT)
Dept: PHYSICAL THERAPY | Age: 34
End: 2024-09-30
Payer: COMMERCIAL

## 2024-09-30 DIAGNOSIS — M62.89 HIGH-TONE PELVIC FLOOR DYSFUNCTION: Primary | ICD-10-CM

## 2024-09-30 PROCEDURE — 97530 THERAPEUTIC ACTIVITIES: CPT | Performed by: PHYSICAL THERAPIST

## 2024-09-30 PROCEDURE — 97140 MANUAL THERAPY 1/> REGIONS: CPT | Performed by: PHYSICAL THERAPIST

## 2024-09-30 PROCEDURE — 97110 THERAPEUTIC EXERCISES: CPT | Performed by: PHYSICAL THERAPIST

## 2025-01-23 ENCOUNTER — OFFICE VISIT (OUTPATIENT)
Dept: URGENT CARE | Facility: CLINIC | Age: 35
End: 2025-01-23
Payer: COMMERCIAL

## 2025-01-23 VITALS
RESPIRATION RATE: 18 BRPM | HEART RATE: 102 BPM | DIASTOLIC BLOOD PRESSURE: 64 MMHG | SYSTOLIC BLOOD PRESSURE: 124 MMHG | TEMPERATURE: 97.8 F | OXYGEN SATURATION: 96 %

## 2025-01-23 DIAGNOSIS — R68.89 FLU-LIKE SYMPTOMS: Primary | ICD-10-CM

## 2025-01-23 DIAGNOSIS — J02.9 SORE THROAT: ICD-10-CM

## 2025-01-23 LAB — S PYO AG THROAT QL: NEGATIVE

## 2025-01-23 PROCEDURE — 87070 CULTURE OTHR SPECIMN AEROBIC: CPT | Performed by: STUDENT IN AN ORGANIZED HEALTH CARE EDUCATION/TRAINING PROGRAM

## 2025-01-23 PROCEDURE — 87880 STREP A ASSAY W/OPTIC: CPT | Performed by: STUDENT IN AN ORGANIZED HEALTH CARE EDUCATION/TRAINING PROGRAM

## 2025-01-23 PROCEDURE — 87636 SARSCOV2 & INF A&B AMP PRB: CPT | Performed by: STUDENT IN AN ORGANIZED HEALTH CARE EDUCATION/TRAINING PROGRAM

## 2025-01-23 PROCEDURE — G0382 LEV 3 HOSP TYPE B ED VISIT: HCPCS | Performed by: STUDENT IN AN ORGANIZED HEALTH CARE EDUCATION/TRAINING PROGRAM

## 2025-01-23 PROCEDURE — S9083 URGENT CARE CENTER GLOBAL: HCPCS | Performed by: STUDENT IN AN ORGANIZED HEALTH CARE EDUCATION/TRAINING PROGRAM

## 2025-01-23 RX ORDER — BROMPHENIRAMINE MALEATE, PSEUDOEPHEDRINE HYDROCHLORIDE, AND DEXTROMETHORPHAN HYDROBROMIDE 2; 30; 10 MG/5ML; MG/5ML; MG/5ML
5 SYRUP ORAL 4 TIMES DAILY PRN
Qty: 120 ML | Refills: 0 | Status: SHIPPED | OUTPATIENT
Start: 2025-01-23

## 2025-01-23 NOTE — LETTER
January 23, 2025     Patient: Martínez Franco   YOB: 1990   Date of Visit: 1/23/2025       To Whom It May Concern:    It is my medical opinion that Martínez Franco may return to work on 1/25/2025 .    If you have any questions or concerns, please don't hesitate to call.         Sincerely,        Nedra Zhang,     CC: No Recipients

## 2025-01-23 NOTE — PROGRESS NOTES
[facility]        NAME: Martínez Franco is a 35 y.o. male  : 1990    MRN: [id number]  DATE: 2025  TIME: 1:49 PM    Assessment and Orders   Flu-like symptoms [R68.89]  1. Flu-like symptoms  Covid/Flu- Office Collect Normal    brompheniramine-pseudoephedrine-DM 30-2-10 MG/5ML syrup    Covid/Flu- Office Collect Normal    Throat culture    Throat culture      2. Sore throat  POCT rapid ANTIGEN strepA            Plan and Discussion      Symptoms and exam consistent with viral illness. NO fevers or body aches, so less concerned for influenza, but will follow up with swab for flu/COVID testing. Rapid strep was negative, but step daughter recently tested positive for strep so will follow up with throat culture to r/o GAS.      Risks and benefits discussed. Patient understands and agrees with the plan.     PATIENT INSTRUCTIONS    If tests have been performed at [facility], our office will contact you with results if changes need to be made to the care plan discussed with you at the visit.  You can review your full results on [facility] MyChart.    Follow up with PCP.     If any of the following occur, please report to your nearest ED for evaluation or call 911.   Difficultly breathing or shortness of breath  Chest pain  Acutely worsening symptoms.         Chief Complaint     Chief Complaint   Patient presents with    Sore Throat     Sore throat, post nasal drip, cough since yesterday. Body aches started last night.          History of Present Illness       Sore Throat   This is a new problem. The current episode started yesterday. The problem has been unchanged. There has been no fever. Associated symptoms include congestion, coughing and headaches. Pertinent negatives include no trouble swallowing. He has had exposure to strep.       Review of Systems   Review of Systems   HENT:  Positive for congestion and sore throat. Negative for trouble swallowing.    Respiratory:  Positive for cough.     Neurological:  Positive for headaches.         Current Medications       Current Outpatient Medications:     albuterol (Ventolin HFA) 90 mcg/act inhaler, Inhale 1-2 puffs every 6 (six) hours as needed for wheezing, Disp: 18 g, Rfl: 0    brompheniramine-pseudoephedrine-DM 30-2-10 MG/5ML syrup, Take 5 mL by mouth 4 (four) times a day as needed for allergies, cough or congestion, Disp: 120 mL, Rfl: 0    Cetirizine HCl (ZYRTEC ALLERGY PO), Take by mouth if needed, Disp: , Rfl:     fluticasone (FLONASE) 50 mcg/act nasal spray, 1 spray into each nostril if needed for rhinitis, Disp: , Rfl:     tamsulosin (FLOMAX) 0.4 mg, Take 1 capsule (0.4 mg total) by mouth daily at bedtime (Patient not taking: Reported on 1/23/2025), Disp: 30 capsule, Rfl: 2    Current Allergies     Allergies as of 01/23/2025 - Reviewed 01/23/2025   Allergen Reaction Noted    Pollen extract Other (See Comments) 03/12/2020            The following portions of the patient's history were reviewed and updated as appropriate: allergies, current medications, past family history, past medical history, past social history, past surgical history and problem list.     Past Medical History:   Diagnosis Date    Asthma     Bell's palsy     BPH (benign prostatic hypertrophy) 2023    Hyperlipidemia     Childhood    Kidney stone 2008    Prostatitis 2022    Urinary tract infection 2022       History reviewed. No pertinent surgical history.    Family History   Problem Relation Age of Onset    Hypertension Mother     Stroke Mother     Migraines Mother     Diabetes Mother     COPD Father     Asthma Father     Migraines Brother     Migraines Brother     No Known Problems Brother          Medications have been verified.        Objective   /64   Pulse 102   Temp 97.8 °F (36.6 °C)   Resp 18   SpO2 96%   No LMP for male patient.       Physical Exam     Physical Exam  Constitutional:       General: He is not in acute distress.     Appearance: He is not ill-appearing  or toxic-appearing.   HENT:      Head: Normocephalic and atraumatic.      Right Ear: Tympanic membrane and external ear normal.      Left Ear: Tympanic membrane and external ear normal.      Nose: Congestion present.      Comments: Boggy nasal turbinates     Mouth/Throat:      Pharynx: Posterior oropharyngeal erythema present.      Comments: Cobblestone appearance in posterior pharynx  Cardiovascular:      Rate and Rhythm: Normal rate and regular rhythm.   Pulmonary:      Effort: Pulmonary effort is normal. No respiratory distress.      Breath sounds: No wheezing.   Neurological:      General: No focal deficit present.      Mental Status: He is alert and oriented to person, place, and time.   Psychiatric:         Mood and Affect: Mood normal.         Behavior: Behavior normal.         Thought Content: Thought content normal.         Judgment: Judgment normal.               Nedra Zhang DO

## 2025-01-24 LAB
FLUAV RNA RESP QL NAA+PROBE: NEGATIVE
FLUBV RNA RESP QL NAA+PROBE: NEGATIVE
SARS-COV-2 RNA RESP QL NAA+PROBE: NEGATIVE

## 2025-01-25 LAB — BACTERIA THROAT CULT: NORMAL
